# Patient Record
Sex: FEMALE | Race: BLACK OR AFRICAN AMERICAN | NOT HISPANIC OR LATINO | Employment: FULL TIME | URBAN - METROPOLITAN AREA
[De-identification: names, ages, dates, MRNs, and addresses within clinical notes are randomized per-mention and may not be internally consistent; named-entity substitution may affect disease eponyms.]

---

## 2018-05-31 ENCOUNTER — HOSPITAL ENCOUNTER (OUTPATIENT)
Dept: RADIOLOGY | Facility: HOSPITAL | Age: 53
Discharge: HOME | End: 2018-05-31
Attending: INTERNAL MEDICINE
Payer: COMMERCIAL

## 2018-05-31 ENCOUNTER — OFFICE VISIT (OUTPATIENT)
Dept: INTERNAL MEDICINE | Facility: CLINIC | Age: 53
End: 2018-05-31
Payer: COMMERCIAL

## 2018-05-31 VITALS
DIASTOLIC BLOOD PRESSURE: 90 MMHG | TEMPERATURE: 98 F | HEIGHT: 63 IN | WEIGHT: 185.44 LBS | HEART RATE: 84 BPM | RESPIRATION RATE: 16 BRPM | BODY MASS INDEX: 32.86 KG/M2 | SYSTOLIC BLOOD PRESSURE: 136 MMHG

## 2018-05-31 DIAGNOSIS — Z12.31 ENCOUNTER FOR SCREENING MAMMOGRAM FOR HIGH-RISK PATIENT: ICD-10-CM

## 2018-05-31 DIAGNOSIS — Z00.00 ANNUAL PHYSICAL EXAM: Primary | ICD-10-CM

## 2018-05-31 DIAGNOSIS — Z12.11 SCREEN FOR COLON CANCER: ICD-10-CM

## 2018-05-31 DIAGNOSIS — Z11.59 NEED FOR HEPATITIS C SCREENING TEST: ICD-10-CM

## 2018-05-31 DIAGNOSIS — R03.0 ELEVATED BLOOD PRESSURE READING: ICD-10-CM

## 2018-05-31 DIAGNOSIS — E66.9 OBESITY (BMI 30.0-34.9): ICD-10-CM

## 2018-05-31 PROBLEM — R81 GLUCOSURIA: Status: ACTIVE | Noted: 2018-05-31

## 2018-05-31 PROCEDURE — 77067 SCR MAMMO BI INCL CAD: CPT | Mod: TC,PO

## 2018-05-31 PROCEDURE — 99386 PREV VISIT NEW AGE 40-64: CPT | Mod: S$GLB,,, | Performed by: INTERNAL MEDICINE

## 2018-05-31 PROCEDURE — 77067 SCR MAMMO BI INCL CAD: CPT | Mod: 26,,, | Performed by: RADIOLOGY

## 2018-05-31 PROCEDURE — 77063 BREAST TOMOSYNTHESIS BI: CPT | Mod: 26,,, | Performed by: RADIOLOGY

## 2018-05-31 PROCEDURE — 99999 PR PBB SHADOW E&M-NEW PATIENT-LVL IV: CPT | Mod: PBBFAC,,, | Performed by: INTERNAL MEDICINE

## 2018-05-31 NOTE — PROGRESS NOTES
Subjective:      Elva Rob is a 52 y.o. female who presents for annual exam.    Family History:  family history includes Breast cancer (age of onset: 55) in her mother; Diabetes in her mother; Gestational diabetes in her sister; Hypertension in her father; Prostate cancer in her paternal uncle.    Health Maintenance:  Health Maintenance    Patient has no pending health maintenance at this time       Eye exam: wears reading glasses, last appt 1 year  Dental Exam: 4 yrs ago, no issues  OB/GYN: Dr. Miner,@ [facility], she plans to make appt    MMG: due now  Last Pap: not in last 3 years  Tetanus: reports UTD    Exercise: reports that she is physically active  Diet: fairly healthy  Body mass index is 32.84 kg/m².    Meds: No current outpatient prescriptions on file.    PMHx:   Past Medical History:   Diagnosis Date    Nephrolithiasis        PSHx:   History reviewed. No pertinent surgical history.    SocHx:   Social History     Social History    Marital status: Single     Spouse name: N/A    Number of children: N/A    Years of education: N/A     Social History Main Topics    Smoking status: Never Smoker    Smokeless tobacco: Never Used    Alcohol use Yes      Comment: occasionaltl    Drug use: Unknown    Sexual activity: Not Asked     Other Topics Concern    None     Social History Narrative    None       Review of Systems   Constitutional: Negative for chills, fatigue and fever.   HENT: Negative for congestion, dental problem, ear discharge, ear pain, mouth sores, postnasal drip, rhinorrhea, sinus pressure and sore throat.    Eyes: Negative for redness and visual disturbance.   Respiratory: Negative for cough, chest tightness and shortness of breath.    Cardiovascular: Negative for chest pain, palpitations and leg swelling.   Gastrointestinal: Positive for diarrhea (and gas). Negative for abdominal pain, blood in stool, constipation, nausea and vomiting.   Endocrine: Positive for polydipsia. Negative  for cold intolerance, heat intolerance, polyphagia and polyuria.   Genitourinary: Negative for decreased urine volume, dysuria, flank pain and hematuria.   Musculoskeletal: Negative for arthralgias and myalgias.   Skin: Negative for rash.   Neurological: Negative for dizziness, weakness, light-headedness, numbness and headaches.   Hematological: Negative for adenopathy.   Psychiatric/Behavioral: Negative for dysphoric mood. The patient is not nervous/anxious.        Objective:      Physical Exam   Constitutional: She is oriented to person, place, and time. Vital signs are normal. She appears well-developed and well-nourished. No distress.   HENT:   Head: Normocephalic and atraumatic.   Right Ear: Hearing, tympanic membrane, external ear and ear canal normal. Tympanic membrane is not erythematous and not bulging.   Left Ear: Hearing, tympanic membrane, external ear and ear canal normal. Tympanic membrane is not erythematous and not bulging.   Nose: Nose normal.   Mouth/Throat: Uvula is midline, oropharynx is clear and moist and mucous membranes are normal. No oropharyngeal exudate or posterior oropharyngeal erythema.   Eyes: Conjunctivae, EOM and lids are normal. Pupils are equal, round, and reactive to light. No scleral icterus.   Neck: Normal range of motion. Neck supple. No thyroid mass and no thyromegaly present.   Cardiovascular: Normal rate, regular rhythm, normal heart sounds and intact distal pulses.    No murmur heard.  Pulmonary/Chest: Effort normal and breath sounds normal. She has no wheezes.   Abdominal: Soft. Bowel sounds are normal. She exhibits no distension. There is no hepatosplenomegaly. There is no tenderness. There is no rigidity, no rebound and no guarding.   Musculoskeletal: Normal range of motion. She exhibits no edema.   Lymphadenopathy:     She has no cervical adenopathy.        Right: No supraclavicular adenopathy present.        Left: No supraclavicular adenopathy present.   Neurological:  She is alert and oriented to person, place, and time. She has normal reflexes. Coordination and gait normal.   Skin: Skin is warm, dry and intact. No rash noted. She is not diaphoretic.   Psychiatric: She has a normal mood and affect.   Vitals reviewed.      Assessment:       1. Annual physical exam    2. Obesity (BMI 30.0-34.9)    3. Need for hepatitis C screening test    4. Elevated blood pressure reading    5. Encounter for screening mammogram for high-risk patient    6. Screen for colon cancer        Plan:       1. Annual physical exam  - CBC auto differential; Future  - Comprehensive metabolic panel; Future  - Hemoglobin A1c; Future  - Lipid panel; Future  - TSH; Future  - Urinalysis; Future    2. Elevated blood pressure reading  - repeat BP reading still elevated  - advised monitoring BP periodically and call if persistently >140/90's    3. Obesity (BMI 30.0-34.9)  - plans to increase physical activity    4. Need for hepatitis C screening test  - Hepatitis C antibody; Future    5. Encounter for screening mammogram for high-risk patient  - Mammo Digital Screening Bilat with CAD; Future    6. Screen for colon cancer  - Case request GI: COLONOSCOPY      RTC in 1 year or sooner if needed      Bebe Saldana MD

## 2018-06-11 ENCOUNTER — TELEPHONE (OUTPATIENT)
Dept: INTERNAL MEDICINE | Facility: CLINIC | Age: 53
End: 2018-06-11

## 2018-06-11 DIAGNOSIS — R81 GLUCOSURIA: ICD-10-CM

## 2018-06-11 DIAGNOSIS — Z00.00 ANNUAL PHYSICAL EXAM: Primary | ICD-10-CM

## 2018-12-15 ENCOUNTER — TELEPHONE (OUTPATIENT)
Dept: ENDOSCOPY | Facility: HOSPITAL | Age: 53
End: 2018-12-15

## 2020-10-05 ENCOUNTER — PATIENT MESSAGE (OUTPATIENT)
Dept: ADMINISTRATIVE | Facility: HOSPITAL | Age: 55
End: 2020-10-05

## 2021-01-04 ENCOUNTER — PATIENT MESSAGE (OUTPATIENT)
Dept: ADMINISTRATIVE | Facility: HOSPITAL | Age: 56
End: 2021-01-04

## 2021-04-05 ENCOUNTER — PATIENT MESSAGE (OUTPATIENT)
Dept: ADMINISTRATIVE | Facility: HOSPITAL | Age: 56
End: 2021-04-05

## 2022-01-01 ENCOUNTER — PATIENT MESSAGE (OUTPATIENT)
Dept: ADMINISTRATIVE | Facility: OTHER | Age: 57
End: 2022-01-01
Payer: COMMERCIAL

## 2022-01-01 ENCOUNTER — LAB VISIT (OUTPATIENT)
Dept: PRIMARY CARE CLINIC | Facility: OTHER | Age: 57
End: 2022-01-01
Attending: INTERNAL MEDICINE
Payer: COMMERCIAL

## 2022-01-01 DIAGNOSIS — R05.9 COUGH: ICD-10-CM

## 2022-01-01 PROCEDURE — U0003 INFECTIOUS AGENT DETECTION BY NUCLEIC ACID (DNA OR RNA); SEVERE ACUTE RESPIRATORY SYNDROME CORONAVIRUS 2 (SARS-COV-2) (CORONAVIRUS DISEASE [COVID-19]), AMPLIFIED PROBE TECHNIQUE, MAKING USE OF HIGH THROUGHPUT TECHNOLOGIES AS DESCRIBED BY CMS-2020-01-R: HCPCS | Performed by: INTERNAL MEDICINE

## 2022-01-05 LAB
SARS-COV-2 RNA RESP QL NAA+PROBE: NOT DETECTED
SARS-COV-2- CYCLE NUMBER: NORMAL

## 2022-02-08 ENCOUNTER — OFFICE VISIT (OUTPATIENT)
Dept: INTERNAL MEDICINE | Facility: CLINIC | Age: 57
End: 2022-02-08
Payer: COMMERCIAL

## 2022-02-08 ENCOUNTER — LAB VISIT (OUTPATIENT)
Dept: LAB | Facility: HOSPITAL | Age: 57
End: 2022-02-08
Attending: INTERNAL MEDICINE
Payer: COMMERCIAL

## 2022-02-08 ENCOUNTER — TELEPHONE (OUTPATIENT)
Dept: INTERNAL MEDICINE | Facility: CLINIC | Age: 57
End: 2022-02-08

## 2022-02-08 VITALS
HEIGHT: 63 IN | BODY MASS INDEX: 31.61 KG/M2 | WEIGHT: 178.38 LBS | HEART RATE: 92 BPM | TEMPERATURE: 97 F | RESPIRATION RATE: 18 BRPM | DIASTOLIC BLOOD PRESSURE: 86 MMHG | OXYGEN SATURATION: 98 % | SYSTOLIC BLOOD PRESSURE: 138 MMHG

## 2022-02-08 DIAGNOSIS — Z11.59 NEED FOR HEPATITIS C SCREENING TEST: ICD-10-CM

## 2022-02-08 DIAGNOSIS — E11.9 TYPE 2 DIABETES MELLITUS WITHOUT COMPLICATION, WITHOUT LONG-TERM CURRENT USE OF INSULIN: Primary | ICD-10-CM

## 2022-02-08 DIAGNOSIS — Z00.00 ANNUAL PHYSICAL EXAM: ICD-10-CM

## 2022-02-08 DIAGNOSIS — E66.9 OBESITY (BMI 30.0-34.9): ICD-10-CM

## 2022-02-08 DIAGNOSIS — Z12.11 SCREEN FOR COLON CANCER: ICD-10-CM

## 2022-02-08 DIAGNOSIS — R03.0 ELEVATED BLOOD PRESSURE READING IN OFFICE WITHOUT DIAGNOSIS OF HYPERTENSION: ICD-10-CM

## 2022-02-08 DIAGNOSIS — Z00.00 ANNUAL PHYSICAL EXAM: Primary | ICD-10-CM

## 2022-02-08 PROBLEM — E78.5 HYPERLIPIDEMIA ASSOCIATED WITH TYPE 2 DIABETES MELLITUS: Status: ACTIVE | Noted: 2022-02-08

## 2022-02-08 PROBLEM — E11.69 HYPERLIPIDEMIA ASSOCIATED WITH TYPE 2 DIABETES MELLITUS: Status: ACTIVE | Noted: 2022-02-08

## 2022-02-08 PROBLEM — R81 GLUCOSURIA: Status: RESOLVED | Noted: 2018-05-31 | Resolved: 2022-02-08

## 2022-02-08 LAB
ALBUMIN SERPL BCP-MCNC: 3.9 G/DL (ref 3.5–5.2)
ALP SERPL-CCNC: 99 U/L (ref 55–135)
ALT SERPL W/O P-5'-P-CCNC: 31 U/L (ref 10–44)
ANION GAP SERPL CALC-SCNC: 12 MMOL/L (ref 8–16)
AST SERPL-CCNC: 21 U/L (ref 10–40)
BASOPHILS # BLD AUTO: 0.03 K/UL (ref 0–0.2)
BASOPHILS NFR BLD: 0.6 % (ref 0–1.9)
BILIRUB SERPL-MCNC: 0.7 MG/DL (ref 0.1–1)
BUN SERPL-MCNC: 11 MG/DL (ref 6–20)
CALCIUM SERPL-MCNC: 9.8 MG/DL (ref 8.7–10.5)
CHLORIDE SERPL-SCNC: 99 MMOL/L (ref 95–110)
CHOLEST SERPL-MCNC: 225 MG/DL (ref 120–199)
CHOLEST/HDLC SERPL: 5 {RATIO} (ref 2–5)
CO2 SERPL-SCNC: 23 MMOL/L (ref 23–29)
CREAT SERPL-MCNC: 0.7 MG/DL (ref 0.5–1.4)
DIFFERENTIAL METHOD: ABNORMAL
EOSINOPHIL # BLD AUTO: 0.1 K/UL (ref 0–0.5)
EOSINOPHIL NFR BLD: 1.8 % (ref 0–8)
ERYTHROCYTE [DISTWIDTH] IN BLOOD BY AUTOMATED COUNT: 13.1 % (ref 11.5–14.5)
EST. GFR  (AFRICAN AMERICAN): >60 ML/MIN/1.73 M^2
EST. GFR  (NON AFRICAN AMERICAN): >60 ML/MIN/1.73 M^2
ESTIMATED AVG GLUCOSE: 303 MG/DL (ref 68–131)
GLUCOSE SERPL-MCNC: 306 MG/DL (ref 70–110)
HBA1C MFR BLD: 12.2 % (ref 4–5.6)
HCT VFR BLD AUTO: 43.3 % (ref 37–48.5)
HDLC SERPL-MCNC: 45 MG/DL (ref 40–75)
HDLC SERPL: 20 % (ref 20–50)
HGB BLD-MCNC: 13.9 G/DL (ref 12–16)
IMM GRANULOCYTES # BLD AUTO: 0.01 K/UL (ref 0–0.04)
IMM GRANULOCYTES NFR BLD AUTO: 0.2 % (ref 0–0.5)
LDLC SERPL CALC-MCNC: 153 MG/DL (ref 63–159)
LYMPHOCYTES # BLD AUTO: 2.5 K/UL (ref 1–4.8)
LYMPHOCYTES NFR BLD: 50.1 % (ref 18–48)
MCH RBC QN AUTO: 29 PG (ref 27–31)
MCHC RBC AUTO-ENTMCNC: 32.1 G/DL (ref 32–36)
MCV RBC AUTO: 90 FL (ref 82–98)
MONOCYTES # BLD AUTO: 0.4 K/UL (ref 0.3–1)
MONOCYTES NFR BLD: 7.6 % (ref 4–15)
NEUTROPHILS # BLD AUTO: 2 K/UL (ref 1.8–7.7)
NEUTROPHILS NFR BLD: 39.7 % (ref 38–73)
NONHDLC SERPL-MCNC: 180 MG/DL
NRBC BLD-RTO: 0 /100 WBC
PLATELET # BLD AUTO: 322 K/UL (ref 150–450)
PMV BLD AUTO: 10.3 FL (ref 9.2–12.9)
POTASSIUM SERPL-SCNC: 4 MMOL/L (ref 3.5–5.1)
PROT SERPL-MCNC: 7.9 G/DL (ref 6–8.4)
RBC # BLD AUTO: 4.79 M/UL (ref 4–5.4)
SODIUM SERPL-SCNC: 134 MMOL/L (ref 136–145)
TRIGL SERPL-MCNC: 135 MG/DL (ref 30–150)
TSH SERPL DL<=0.005 MIU/L-ACNC: 0.77 UIU/ML (ref 0.4–4)
WBC # BLD AUTO: 5.01 K/UL (ref 3.9–12.7)

## 2022-02-08 PROCEDURE — 3079F PR MOST RECENT DIASTOLIC BLOOD PRESSURE 80-89 MM HG: ICD-10-PCS | Mod: CPTII,S$GLB,, | Performed by: INTERNAL MEDICINE

## 2022-02-08 PROCEDURE — 3075F PR MOST RECENT SYSTOLIC BLOOD PRESS GE 130-139MM HG: ICD-10-PCS | Mod: CPTII,S$GLB,, | Performed by: INTERNAL MEDICINE

## 2022-02-08 PROCEDURE — 99386 PREV VISIT NEW AGE 40-64: CPT | Mod: S$GLB,,, | Performed by: INTERNAL MEDICINE

## 2022-02-08 PROCEDURE — 80061 LIPID PANEL: CPT | Performed by: INTERNAL MEDICINE

## 2022-02-08 PROCEDURE — 1160F PR REVIEW ALL MEDS BY PRESCRIBER/CLIN PHARMACIST DOCUMENTED: ICD-10-PCS | Mod: CPTII,S$GLB,, | Performed by: INTERNAL MEDICINE

## 2022-02-08 PROCEDURE — 80053 COMPREHEN METABOLIC PANEL: CPT | Performed by: INTERNAL MEDICINE

## 2022-02-08 PROCEDURE — 85025 COMPLETE CBC W/AUTO DIFF WBC: CPT | Performed by: INTERNAL MEDICINE

## 2022-02-08 PROCEDURE — 1159F MED LIST DOCD IN RCRD: CPT | Mod: CPTII,S$GLB,, | Performed by: INTERNAL MEDICINE

## 2022-02-08 PROCEDURE — 1159F PR MEDICATION LIST DOCUMENTED IN MEDICAL RECORD: ICD-10-PCS | Mod: CPTII,S$GLB,, | Performed by: INTERNAL MEDICINE

## 2022-02-08 PROCEDURE — 99999 PR PBB SHADOW E&M-EST. PATIENT-LVL IV: CPT | Mod: PBBFAC,,, | Performed by: INTERNAL MEDICINE

## 2022-02-08 PROCEDURE — 1160F RVW MEDS BY RX/DR IN RCRD: CPT | Mod: CPTII,S$GLB,, | Performed by: INTERNAL MEDICINE

## 2022-02-08 PROCEDURE — 84443 ASSAY THYROID STIM HORMONE: CPT | Performed by: INTERNAL MEDICINE

## 2022-02-08 PROCEDURE — 3008F BODY MASS INDEX DOCD: CPT | Mod: CPTII,S$GLB,, | Performed by: INTERNAL MEDICINE

## 2022-02-08 PROCEDURE — 86803 HEPATITIS C AB TEST: CPT | Performed by: INTERNAL MEDICINE

## 2022-02-08 PROCEDURE — 3079F DIAST BP 80-89 MM HG: CPT | Mod: CPTII,S$GLB,, | Performed by: INTERNAL MEDICINE

## 2022-02-08 PROCEDURE — 3075F SYST BP GE 130 - 139MM HG: CPT | Mod: CPTII,S$GLB,, | Performed by: INTERNAL MEDICINE

## 2022-02-08 PROCEDURE — 83036 HEMOGLOBIN GLYCOSYLATED A1C: CPT | Performed by: INTERNAL MEDICINE

## 2022-02-08 PROCEDURE — 36415 COLL VENOUS BLD VENIPUNCTURE: CPT | Mod: PO | Performed by: INTERNAL MEDICINE

## 2022-02-08 PROCEDURE — 99386 PR PREVENTIVE VISIT,NEW,40-64: ICD-10-PCS | Mod: S$GLB,,, | Performed by: INTERNAL MEDICINE

## 2022-02-08 PROCEDURE — 3008F PR BODY MASS INDEX (BMI) DOCUMENTED: ICD-10-PCS | Mod: CPTII,S$GLB,, | Performed by: INTERNAL MEDICINE

## 2022-02-08 PROCEDURE — 99999 PR PBB SHADOW E&M-EST. PATIENT-LVL IV: ICD-10-PCS | Mod: PBBFAC,,, | Performed by: INTERNAL MEDICINE

## 2022-02-08 RX ORDER — INSULIN PUMP SYRINGE, 3 ML
1 EACH MISCELLANEOUS ONCE
Qty: 1 EACH | Refills: 0
Start: 2022-02-08 | End: 2022-02-08

## 2022-02-08 RX ORDER — METFORMIN HYDROCHLORIDE 500 MG/1
500 TABLET ORAL 2 TIMES DAILY WITH MEALS
Qty: 180 TABLET | Refills: 3
Start: 2022-02-08

## 2022-02-08 RX ORDER — LANCETS
1 EACH MISCELLANEOUS 2 TIMES DAILY
Qty: 100 EACH | Refills: 11
Start: 2022-02-08

## 2022-02-08 NOTE — PATIENT INSTRUCTIONS
"  Patient information: Low-sodium diet (The Basics)       What is sodium? -- Sodium is the main ingredient in table salt. It is also found in lots of foods, and even in water. The body needs a very small amount of sodium to work normally, but most people eat much more sodium than their body needs.       Who should cut down on sodium? -- Nearly everyone eats too much sodium. The average American takes in 3,400 milligrams of sodium each day. Experts say that many people should have no more than 2,300 milligrams a day, and many people should have even less.       Why should I cut down on sodium? -- Reducing the amount of sodium you eat can have lots of health benefits:     ?   It can lower your blood pressure, which means it can help reduce your risk of stroke, heart attack, kidney damage, and lots of other health problems.  ?   It can reduce the amount of fluid in your body, which means that your heart doesn't have to work as hard to push a lot of fluid around.  ?   It can keep the kidneys from having to work too hard. This is especially important in people who have kidney disease.  ?   It can reduce swelling in the ankles and belly, which can be uncomfortable and make it hard to move.  ?   It can reduce the chances of forming kidney stones.  ?   It can help keep your bones strong.      Which foods have the most sodium? -- Processed foods have the most sodium. These foods usually come in cans, boxes, jars, and bags. They tend to have a lot of sodium even if they don't taste salty. In fact, many sweet foods have a lot of sodium in them. The only way to know for sure how much sodium you are getting is to check the label.    Here are some examples of foods that often have too much sodium:     ?   Canned soups  ?   Rice and noodle mixes   ?   Sauces, dressings, and condiments (such as ketchup and mustard)  ?   Pre-made frozen meals (also called "TV dinners")  ?   Deli meats, hot dogs, and cheeses   ?   Smoked, cured, or " "pickled foods  ?   Restaurant meals     What should I do to reduce the amount of sodium in my diet? -- Many people think that avoiding the salt shaker and not adding salt to their food means that they are eating a low-sodium diet. This is not true. Not adding salt at the table or when cooking will help a little. But almost all of the sodium you eat is already in the food you buy at the grocery store or at restaurants.    The most important thing you can do to cut down on sodium is to eat less processed food. That means that you should avoid most foods that are sold in cans, boxes, jars and bags. You should also eat in restaurants less often.     Instead of buying pre-made, processed foods, buy fresh or fresh-frozen fruits and vegetables. (Fresh-frozen foods are foods that are frozen without anything added to them.) Buy meats, fish, chicken, and turkey that are fresh instead of canned or sold at the deli counter. (Meats sold at the deli counter are high in sodium). Then try making meals from scratch at home using these low-sodium ingredients.    If you must buy canned or packaged foods, choose ones that are labeled "sodium free" or "very low sodium". Or choose foods that have less than 400 milligrams of sodium in each serving. The amount of sodium in each serving appears on the nutrition label that is printed on canned or packaged foods.    Also, whatever changes you make, make them slowly. Choose one thing to do differently, and do that for a while. If that change sticks, add another change. For instance, if you usually eat green beans from a can, try buying fresh or fresh-frozen green beans and cooking them at home without adding salt. If that works for you, keep doing it. Then choose another thing to change. If it doesn't work, don't give up. See if you can cut down on sodium another way. The important thing is to take small steps and to stick with the changes that work for you.       What if I really like to eat " "out? -- You can still eat in restaurants once in a while. But choose places that offer healthier choices. Fast-food places are almost always a bad idea. As an example, a typical meal of a hamburger and french fries from a popular fast-food chain has about 1,600 milligrams of sodium. That's more sodium than many people should eat in a day!     When choosing what to order:    ?   Ask your  if your meal can be made without salt  ?   Avoid foods that come with sauces or dips  ?   Choose plain grilled meats or fish and steamed vegetables  ?   Ask for oil and vinegar for your salad, rather than dressing      What if food just does not taste as good without sodium? -- First of all, give it time. Your taste buds can get used to having less sodium, but you have to give them a chance to adjust. Also try other flavorings, such as herbs and spices, lemon juice, and vinegar.      What about salt substitutes? -- Do not use salt substitutes unless your doctor or nurse approves. Some salt substitutes can be dangerous to your health, especially if you take certain medicines.      Do medicines have sodium? -- Yes, some medicines have sodium. If you are buying medicines you can get without a prescription, look to see how much sodium they have. Avoid products that have "sodium carbonate" or  'sodium bicarbonate" unless your doctor prescribes them. (Sodium bicarbonate is baking soda.)      This topic retrieved from [employer] on: Feb 17, 2016      "

## 2022-02-08 NOTE — PROGRESS NOTES
Subjective:     PCP: Bebe Saldana MD    Elva Rob is a 56 y.o. female who presents for an annual exam. She has not been seen in clinic since 2018 and will consider her a new patient.     Medical History:   Past Medical History:   Diagnosis Date    Nephrolithiasis        Family History: family history includes Breast cancer (age of onset: 55) in her mother; Diabetes in her mother; Gestational diabetes in her sister; Hypertension in her father; Prostate cancer in her paternal uncle; Thyroid disease in her maternal aunt.    Surgical History: History reviewed. No pertinent surgical history.     Social History:  reports that she has never smoked. She has never used smokeless tobacco. She reports current alcohol use.     Allergies: Review of patient's allergies indicates:  No Known Allergies    Medications:   No current outpatient medications on file.     No current facility-administered medications for this visit.       Health Maintenance:    Eye Exam:   due   Dental Exam: due   OB/GYN: has appointment   Pap smear: due, has appointment   Mammogram: due     Colonoscopy:   due   Hepatitic C  Ab: ordered    Vaccinations:  Immunization History   Administered Date(s) Administered    COVID-19, MRNA, LN-S, PF (Pfizer) (Purple Cap) 03/05/2021, 03/26/2021, 10/05/2021      Influenza: declines   Tetanus: declines   Shingrix: due   Covid vaccine: done    Body mass index is 31.59 kg/m².  Wt Readings from Last 3 Encounters:   02/08/22 80.9 kg (178 lb 5.6 oz)   05/31/18 84.1 kg (185 lb 6.5 oz)       Review of Systems   Constitutional: Negative for chills, diaphoresis, fatigue and fever.   HENT: Negative for congestion, dental problem, ear discharge, ear pain, postnasal drip, rhinorrhea, sinus pressure, sore throat and trouble swallowing.    Eyes: Negative for redness and visual disturbance.   Respiratory: Negative for cough, chest tightness and shortness of breath.    Cardiovascular: Negative for chest pain,  palpitations and leg swelling.   Gastrointestinal: Negative for abdominal pain, blood in stool, constipation, diarrhea, nausea and vomiting.        She noticed that she produces only a small amount of stool with each BM. Denies hard stool.   Endocrine: Negative for cold intolerance, polydipsia and polyuria.   Genitourinary: Negative for decreased urine volume, dysuria, frequency and hematuria.   Musculoskeletal: Negative for arthralgias, back pain and myalgias.   Skin: Negative for rash and wound.   Neurological: Negative for dizziness, weakness, numbness and headaches.   Hematological: Negative for adenopathy.   Psychiatric/Behavioral: Negative for dysphoric mood and sleep disturbance. The patient is not nervous/anxious.           Objective:     Physical Exam  Vitals reviewed.   Constitutional:       General: She is awake. She is not in acute distress.     Appearance: Normal appearance. She is well-developed and well-groomed. She is not diaphoretic.   HENT:      Head: Normocephalic and atraumatic.      Right Ear: Hearing, tympanic membrane, ear canal and external ear normal. Tympanic membrane is not erythematous or bulging.      Left Ear: Hearing, tympanic membrane, ear canal and external ear normal. Tympanic membrane is not erythematous or bulging.      Nose: Nose normal. No congestion.      Mouth/Throat:      Mouth: Mucous membranes are moist.      Tongue: No lesions.      Pharynx: Oropharynx is clear. Uvula midline. No oropharyngeal exudate or posterior oropharyngeal erythema.   Eyes:      General: Lids are normal. Vision grossly intact. Gaze aligned appropriately. No scleral icterus.     Conjunctiva/sclera:      Right eye: Right conjunctiva is not injected.      Left eye: Left conjunctiva is not injected.      Pupils: Pupils are equal, round, and reactive to light.   Neck:      Thyroid: No thyroid mass. Thyromegaly: right lobe is palpable    Cardiovascular:      Rate and Rhythm: Normal rate and regular rhythm.       Pulses: Normal pulses.      Heart sounds: Normal heart sounds. No murmur heard.      Pulmonary:      Effort: Pulmonary effort is normal. No respiratory distress.      Breath sounds: Normal breath sounds. No decreased breath sounds or wheezing.   Chest:   Breasts:      Right: No supraclavicular adenopathy.      Left: No supraclavicular adenopathy.       Abdominal:      General: Bowel sounds are normal. There is no distension.      Palpations: Abdomen is soft. Abdomen is not rigid.      Tenderness: There is no abdominal tenderness. There is no guarding or rebound.   Musculoskeletal:         General: Normal range of motion.      Cervical back: Normal range of motion and neck supple.      Right lower leg: No edema.      Left lower leg: No edema.   Lymphadenopathy:      Cervical: No cervical adenopathy.      Upper Body:      Right upper body: No supraclavicular adenopathy.      Left upper body: No supraclavicular adenopathy.   Skin:     General: Skin is warm and dry.      Coloration: Skin is not cyanotic.      Findings: No lesion or rash.      Nails: There is no clubbing.   Neurological:      General: No focal deficit present.      Mental Status: She is alert and oriented to person, place, and time.      Sensory: Sensation is intact.      Coordination: Coordination is intact.      Gait: Gait is intact.      Deep Tendon Reflexes: Reflexes are normal and symmetric.   Psychiatric:         Attention and Perception: Attention normal.         Mood and Affect: Mood normal.         Behavior: Behavior is cooperative.          Assessment:        1. Annual physical exam    2. Elevated blood pressure reading in office without diagnosis of hypertension    3. Obesity (BMI 30.0-34.9)    4. Screen for colon cancer    5. Need for hepatitis C screening test           Plan:     1. Annual physical exam  - Hemoglobin A1C; Future  - CBC Auto Differential; Future  - Comprehensive Metabolic Panel; Future  - Lipid Panel; Future  - TSH;  Future  - Urinalysis; Future    2. Elevated blood pressure reading in office without diagnosis of hypertension  - avoid all decongestants, decrease salt intake, use salt-free seasoning blends  - advised patient to check BP at work a few times each week and report readings if close to or greater than 140/90  - will consider starting HCTZ if readings remain elevated    3. Obesity (BMI 30.0-34.9)  - has a treadmill and she wants to use it regularly    4. Screen for colon cancer  - Case Request Endoscopy: COLONOSCOPY    5. Need for hepatitis C screening test  - Hepatitis C Antibody; Future      RTC in 3 months for elevated BP or sooner if needed    __________________________    Bebe Saldana MD, PharmD  [facility]  American Board of Obesity Medicine diplomate  Office [phone]

## 2022-02-08 NOTE — TELEPHONE ENCOUNTER
----- Message from Bebe Saldana MD sent at 2/8/2022  1:01 PM CST -----  >>>>> 3 month follow-up for elevated BP

## 2022-02-09 ENCOUNTER — TELEPHONE (OUTPATIENT)
Dept: DIABETES | Facility: CLINIC | Age: 57
End: 2022-02-09
Payer: COMMERCIAL

## 2022-02-09 ENCOUNTER — TELEPHONE (OUTPATIENT)
Dept: INTERNAL MEDICINE | Facility: CLINIC | Age: 57
End: 2022-02-09
Payer: COMMERCIAL

## 2022-02-09 ENCOUNTER — TELEPHONE (OUTPATIENT)
Dept: INTERNAL MEDICINE | Facility: CLINIC | Age: 57
End: 2022-02-09

## 2022-02-09 LAB — HCV AB SERPL QL IA: NEGATIVE

## 2022-02-09 NOTE — TELEPHONE ENCOUNTER
----- Message from Bebe Saldana MD sent at 2/8/2022  9:26 PM CST -----  >>> newly-diagnosed diabetic, please find out which pharmacy she chooses and will send meds  >>>>> also make a f/u appointment within the next 2-3 weeks

## 2022-02-09 NOTE — TELEPHONE ENCOUNTER
----- Message from Bebe Saldana MD sent at 2/8/2022  9:30 PM CST -----  The urinalysis is abnormal. There is a lot of sugar in the urine because of the diabetes and high blood sugar level. The urine is also very concentrated so please increase your water intake.     Have you noticed any symptoms of burning with urination or vaginal discharge?

## 2022-02-09 NOTE — TELEPHONE ENCOUNTER
----- Message from Bebe Saldana MD sent at 2/8/2022  9:21 PM CST -----  Your hemoglobin A1c is very high and you have poorly controlled diabetes.     I will refer you to the Diabetes Educators to discuss management with you. I will order a blood glucometer so you can start monitoring the blood sugar levels. I will order metformin 500mg twice daily. Please start by taking 500mg once every day for the first 2-4 days and then increase to twice daily.    The total cholesterol and LDL cholesterol levels are borderline-high. But they are too high and we will need to discuss treatment.    Thyroid function, kidney function, liver function tests and blood counts are normal.

## 2022-02-18 ENCOUNTER — PATIENT OUTREACH (OUTPATIENT)
Dept: ADMINISTRATIVE | Facility: HOSPITAL | Age: 57
End: 2022-02-18
Payer: COMMERCIAL

## 2022-02-18 DIAGNOSIS — Z12.31 OTHER SCREENING MAMMOGRAM: ICD-10-CM

## 2022-02-18 NOTE — LETTER
AUTHORIZATION FOR RELEASE OF   CONFIDENTIAL INFORMATION        We are seeing Elva Rob, date of birth 1965, in the clinic at [facility]. Bebe Saldana MD is the patient's PCP. Elva Rob has an outstanding lab/procedure at the time we reviewed her chart. In order to help keep her health information updated, she has authorized us to request the following medical record(s):        (  )  MAMMOGRAM                                      (  )  COLONOSCOPY      ( x )  PAP SMEAR                                          (  )  OUTSIDE LAB RESULTS     (  )  DEXA SCAN                                          (  )  EYE EXAM            (  )  FOOT EXAM                                          (  )  ENTIRE RECORD     (  )  OUTSIDE IMMUNIZATIONS                 (  )  _______________         Please fax records to [facility], Linnea T Perkins, MD, [phone]     If you have any questions, please contact Kareen at [phone]          Patient Name: Elva Rob  : 1965  Patient Phone #: [phone]

## 2022-02-24 NOTE — TELEPHONE ENCOUNTER
Pt has been called multiple times and message left.    No response    Would you like me to call her sister?

## 2022-02-25 NOTE — TELEPHONE ENCOUNTER
She has seen the results and comments. She made a f/u appointment online for OB/GYN.     Please send her a message and if she does not answer, make an appointment on the same day as GYN appointment, if schedule allows.

## 2022-03-08 NOTE — TELEPHONE ENCOUNTER
Portal message came back unread.    Appt with OB/GYN is tomorrow, we don't have anything available.    Left another detailed message on voicemail for her to call me back with name of pharmacy and schedule a f/u

## 2022-03-09 ENCOUNTER — OFFICE VISIT (OUTPATIENT)
Dept: OBSTETRICS AND GYNECOLOGY | Facility: CLINIC | Age: 57
End: 2022-03-09
Payer: COMMERCIAL

## 2022-03-09 VITALS
DIASTOLIC BLOOD PRESSURE: 86 MMHG | HEIGHT: 63 IN | SYSTOLIC BLOOD PRESSURE: 128 MMHG | BODY MASS INDEX: 31.36 KG/M2 | WEIGHT: 177 LBS

## 2022-03-09 DIAGNOSIS — E11.69 TYPE 2 DIABETES MELLITUS WITH OTHER SPECIFIED COMPLICATION, WITHOUT LONG-TERM CURRENT USE OF INSULIN: ICD-10-CM

## 2022-03-09 DIAGNOSIS — Z12.11 ENCOUNTER FOR SCREENING COLONOSCOPY: ICD-10-CM

## 2022-03-09 DIAGNOSIS — Z01.419 ENCOUNTER FOR WELL WOMAN EXAM WITH ROUTINE GYNECOLOGICAL EXAM: Primary | ICD-10-CM

## 2022-03-09 PROCEDURE — 3008F PR BODY MASS INDEX (BMI) DOCUMENTED: ICD-10-PCS | Mod: CPTII,S$GLB,, | Performed by: OBSTETRICS & GYNECOLOGY

## 2022-03-09 PROCEDURE — 3079F DIAST BP 80-89 MM HG: CPT | Mod: CPTII,S$GLB,, | Performed by: OBSTETRICS & GYNECOLOGY

## 2022-03-09 PROCEDURE — 87624 HPV HI-RISK TYP POOLED RSLT: CPT | Performed by: OBSTETRICS & GYNECOLOGY

## 2022-03-09 PROCEDURE — 87624 HPV HI-RISK TYP POOLED RSLT: CPT | Mod: 91 | Performed by: OBSTETRICS & GYNECOLOGY

## 2022-03-09 PROCEDURE — 3008F BODY MASS INDEX DOCD: CPT | Mod: CPTII,S$GLB,, | Performed by: OBSTETRICS & GYNECOLOGY

## 2022-03-09 PROCEDURE — 99999 PR PBB SHADOW E&M-EST. PATIENT-LVL III: ICD-10-PCS | Mod: PBBFAC,,, | Performed by: OBSTETRICS & GYNECOLOGY

## 2022-03-09 PROCEDURE — 3074F SYST BP LT 130 MM HG: CPT | Mod: CPTII,S$GLB,, | Performed by: OBSTETRICS & GYNECOLOGY

## 2022-03-09 PROCEDURE — 3074F PR MOST RECENT SYSTOLIC BLOOD PRESSURE < 130 MM HG: ICD-10-PCS | Mod: CPTII,S$GLB,, | Performed by: OBSTETRICS & GYNECOLOGY

## 2022-03-09 PROCEDURE — 88175 CYTOPATH C/V AUTO FLUID REDO: CPT | Performed by: OBSTETRICS & GYNECOLOGY

## 2022-03-09 PROCEDURE — 99999 PR PBB SHADOW E&M-EST. PATIENT-LVL III: CPT | Mod: PBBFAC,,, | Performed by: OBSTETRICS & GYNECOLOGY

## 2022-03-09 PROCEDURE — 3046F PR MOST RECENT HEMOGLOBIN A1C LEVEL > 9.0%: ICD-10-PCS | Mod: CPTII,S$GLB,, | Performed by: OBSTETRICS & GYNECOLOGY

## 2022-03-09 PROCEDURE — 99386 PREV VISIT NEW AGE 40-64: CPT | Mod: S$GLB,,, | Performed by: OBSTETRICS & GYNECOLOGY

## 2022-03-09 PROCEDURE — 99386 PR PREVENTIVE VISIT,NEW,40-64: ICD-10-PCS | Mod: S$GLB,,, | Performed by: OBSTETRICS & GYNECOLOGY

## 2022-03-09 PROCEDURE — 1159F PR MEDICATION LIST DOCUMENTED IN MEDICAL RECORD: ICD-10-PCS | Mod: CPTII,S$GLB,, | Performed by: OBSTETRICS & GYNECOLOGY

## 2022-03-09 PROCEDURE — 3046F HEMOGLOBIN A1C LEVEL >9.0%: CPT | Mod: CPTII,S$GLB,, | Performed by: OBSTETRICS & GYNECOLOGY

## 2022-03-09 PROCEDURE — 1159F MED LIST DOCD IN RCRD: CPT | Mod: CPTII,S$GLB,, | Performed by: OBSTETRICS & GYNECOLOGY

## 2022-03-09 PROCEDURE — 3079F PR MOST RECENT DIASTOLIC BLOOD PRESSURE 80-89 MM HG: ICD-10-PCS | Mod: CPTII,S$GLB,, | Performed by: OBSTETRICS & GYNECOLOGY

## 2022-03-09 NOTE — PROGRESS NOTES
History & Physical  Gynecology      SUBJECTIVE:     Chief Complaint: Annual Exam       History of Present Illness:    Elva Rob is a 56 y.o. female G0 here for annual routine Pap and checkup. No LMP recorded. Patient is postmenopausal..  She has no unusual complaints.  Pt was recently seen by PCP and was noted to have uncontrolled diabetes (Hgb A1C 12.2).  Was given metformin that patient hasn't started yet.  Also has not been checking BG levels.     Pt is not having any bleeding.  Pt is postmenopausal  denies break through bleeding.   denies vaginal itching or irritation.  denies vaginal discharge.    She is not sexually active at this time.    History of abnormal pap: No  Last Pap: none on file  Last MMG: Yes - 2018; order placed  Last Colonoscopy:  No      Review of patient's allergies indicates:  No Known Allergies    Past Medical History:   Diagnosis Date    Nephrolithiasis      History reviewed. No pertinent surgical history.  OB History             Para        Term   0            AB        Living           SAB        IAB        Ectopic        Multiple        Live Births                   Family History   Problem Relation Age of Onset    Breast cancer Mother 55    Diabetes Mother     Hypertension Father     Gestational diabetes Sister     Prostate cancer Paternal Uncle     Thyroid disease Maternal Aunt      Social History     Tobacco Use    Smoking status: Never Smoker    Smokeless tobacco: Never Used   Substance Use Topics    Alcohol use: Not Currently     Comment: occasionaltl    Drug use: Never       Current Outpatient Medications   Medication Sig    blood sugar diagnostic Strp 1 strip by Misc.(Non-Drug; Combo Route) route 2 (two) times daily. Please provide items covered by patient's insurance    lancets Misc 1 lancet by Misc.(Non-Drug; Combo Route) route 2 (two) times daily.    metFORMIN (GLUCOPHAGE) 500 MG tablet Take 1 tablet (500 mg total) by mouth 2 (two) times daily  with meals.    blood-glucose meter kit 1 each by Other route once. Use as instructed. Provide meter covered by patient's insurance for 1 dose     No current facility-administered medications for this visit.         Review of Systems:  Review of Systems   Constitutional: Negative for activity change, appetite change, fatigue, fever and unexpected weight change.   Respiratory: Negative for cough and shortness of breath.    Cardiovascular: Negative for chest pain and leg swelling.   Gastrointestinal: Negative for abdominal pain, blood in stool, constipation, diarrhea, nausea and vomiting.   Endocrine: Negative for diabetes, hair loss and hot flashes.   Genitourinary: Negative for pelvic pain, postcoital bleeding and postmenopausal bleeding.   Musculoskeletal: Negative for back pain.   Integumentary:  Negative for hair changes, breast mass, nipple discharge and breast skin changes.   Psychiatric/Behavioral: Negative for sleep disturbance. The patient is not nervous/anxious.    Breast: Negative for mass, mastodynia, nipple discharge and skin changes       OBJECTIVE:     Physical Exam:  Physical Exam  Constitutional:       Appearance: She is well-developed.   HENT:      Head: Normocephalic and atraumatic.   Eyes:      General: No scleral icterus.        Right eye: No discharge.         Left eye: No discharge.      Conjunctiva/sclera: Conjunctivae normal.   Pulmonary:      Effort: Pulmonary effort is normal.      Breath sounds: No stridor.   Chest:      Chest wall: No mass or tenderness.   Breasts: Breasts are symmetrical.      Right: No inverted nipple, mass, nipple discharge, skin change or tenderness.      Left: No inverted nipple, mass, nipple discharge, skin change or tenderness.       Abdominal:      General: There is no distension.      Palpations: Abdomen is soft.      Tenderness: There is no abdominal tenderness.   Genitourinary:     Labia:         Right: No rash, tenderness, lesion or injury.         Left: No  rash, tenderness, lesion or injury.       Vagina: Normal.      Cervix: No cervical motion tenderness, discharge or friability.      Adnexa:         Right: No mass, tenderness or fullness.          Left: No mass, tenderness or fullness.     Musculoskeletal:         General: Normal range of motion.   Skin:     General: Skin is warm and dry.   Neurological:      Mental Status: She is alert and oriented to person, place, and time.   Psychiatric:         Behavior: Behavior normal.         Thought Content: Thought content normal.         Judgment: Judgment normal.           ASSESSMENT:       ICD-10-CM ICD-9-CM    1. Encounter for well woman exam with routine gynecological exam  Z01.419 V72.31 Liquid-Based Pap Smear, Screening      HPV High Risk Genotypes, PCR   2. Encounter for screening colonoscopy  Z12.11 V76.51 Case Request Endoscopy: COLONOSCOPY   3. Type 2 diabetes mellitus with other specified complication, without long-term current use of insulin  E11.69 250.80 Ambulatory referral/consult to Diabetes Education          Plan:      Elva was seen today for annual exam.    Diagnoses and all orders for this visit:    Encounter for well woman exam with routine gynecological exam  -     Liquid-Based Pap Smear, Screening  -     HPV High Risk Genotypes, PCR  - Cotesting today  - MMG already ordered by PCP.  Will schedule  - Cscope ordered    Encounter for screening colonoscopy  -     Case Request Endoscopy: COLONOSCOPY    Type 2 diabetes mellitus with other specified complication, without long-term current use of insulin  - Discussed lifestyle changes and recommendation to check BG levels  - Pt does not feel comfortable with starting metformin.  Recommend that patient reach out to PCP to further discuss  - Will send for diabetic education.  -     Ambulatory referral/consult to Diabetes Education; Future        Orders Placed This Encounter   Procedures    HPV High Risk Genotypes, PCR    Ambulatory referral/consult to  Diabetes Education       Follow up in about 1 year (around 3/9/2023) for annual.     Face to Face time with patient: 25 min    30 minutes of total time spent on the encounter, which includes face to face time and non-face to face time preparing to see the patient (eg, review of tests), Obtaining and/or reviewing separately obtained history, Documenting clinical information in the electronic or other health record, Independently interpreting results (not separately reported) and communicating results to the patient/family/caregiver, or Care coordination (not separately reported).      Sofiya Henson

## 2022-03-15 LAB
CLINICAL INFO: NORMAL
CYTO CVX: NORMAL
CYTOLOGIST CVX/VAG CYTO: NORMAL
CYTOLOGIST CVX/VAG CYTO: NORMAL
CYTOLOGY CMNT CVX/VAG CYTO-IMP: NORMAL
CYTOLOGY PAP THIN PREP EXPLANATION: NORMAL
DATE OF PREVIOUS PAP: NORMAL
DATE PREVIOUS BX: NO
GEN CATEG CVX/VAG CYTO-IMP: NORMAL
HPV E6+E7 MRNA CVX QL NAA+PROBE: NOT DETECTED
HPV I/H RISK 4 DNA CVX QL NAA+PROBE: NORMAL
LMP START DATE: NORMAL
MICROORGANISM CVX/VAG CYTO: NORMAL
PATHOLOGIST CVX/VAG CYTO: NORMAL
SERVICE CMNT-IMP: NORMAL
SPECIMEN SOURCE CVX/VAG CYTO: NORMAL
STAT OF ADQ CVX/VAG CYTO-IMP: NORMAL

## 2022-03-22 ENCOUNTER — TELEPHONE (OUTPATIENT)
Dept: ADMINISTRATIVE | Facility: HOSPITAL | Age: 57
End: 2022-03-22
Payer: COMMERCIAL

## 2022-04-13 ENCOUNTER — TELEPHONE (OUTPATIENT)
Dept: ADMINISTRATIVE | Facility: HOSPITAL | Age: 57
End: 2022-04-13
Payer: COMMERCIAL

## 2022-06-01 ENCOUNTER — PATIENT MESSAGE (OUTPATIENT)
Dept: ADMINISTRATIVE | Facility: HOSPITAL | Age: 57
End: 2022-06-01
Payer: COMMERCIAL

## 2022-07-12 ENCOUNTER — PATIENT MESSAGE (OUTPATIENT)
Dept: ADMINISTRATIVE | Facility: HOSPITAL | Age: 57
End: 2022-07-12
Payer: COMMERCIAL

## 2022-08-24 ENCOUNTER — PATIENT MESSAGE (OUTPATIENT)
Dept: ADMINISTRATIVE | Facility: HOSPITAL | Age: 57
End: 2022-08-24
Payer: COMMERCIAL

## 2022-10-05 DIAGNOSIS — Z12.11 SCREENING FOR COLON CANCER: Primary | ICD-10-CM

## 2022-10-12 ENCOUNTER — PATIENT MESSAGE (OUTPATIENT)
Dept: ADMINISTRATIVE | Facility: HOSPITAL | Age: 57
End: 2022-10-12
Payer: COMMERCIAL

## 2023-03-02 ENCOUNTER — OFFICE VISIT (OUTPATIENT)
Dept: URGENT CARE | Facility: CLINIC | Age: 58
End: 2023-03-02
Payer: COMMERCIAL

## 2023-03-02 VITALS
RESPIRATION RATE: 16 BRPM | HEART RATE: 82 BPM | WEIGHT: 175 LBS | SYSTOLIC BLOOD PRESSURE: 142 MMHG | DIASTOLIC BLOOD PRESSURE: 87 MMHG | OXYGEN SATURATION: 98 % | BODY MASS INDEX: 31.01 KG/M2 | HEIGHT: 63 IN | TEMPERATURE: 98 F

## 2023-03-02 DIAGNOSIS — U07.1 COVID-19 VIRUS INFECTION: Primary | ICD-10-CM

## 2023-03-02 LAB
CTP QC/QA: YES
SARS-COV-2 AG RESP QL IA.RAPID: POSITIVE

## 2023-03-02 PROCEDURE — 1160F PR REVIEW ALL MEDS BY PRESCRIBER/CLIN PHARMACIST DOCUMENTED: ICD-10-PCS | Mod: CPTII,S$GLB,, | Performed by: NURSE PRACTITIONER

## 2023-03-02 PROCEDURE — 3079F PR MOST RECENT DIASTOLIC BLOOD PRESSURE 80-89 MM HG: ICD-10-PCS | Mod: CPTII,S$GLB,, | Performed by: NURSE PRACTITIONER

## 2023-03-02 PROCEDURE — 1159F MED LIST DOCD IN RCRD: CPT | Mod: CPTII,S$GLB,, | Performed by: NURSE PRACTITIONER

## 2023-03-02 PROCEDURE — 87811 SARS-COV-2 COVID19 W/OPTIC: CPT | Mod: QW,S$GLB,, | Performed by: NURSE PRACTITIONER

## 2023-03-02 PROCEDURE — 87811 SARS CORONAVIRUS 2 ANTIGEN POCT, MANUAL READ: ICD-10-PCS | Mod: QW,S$GLB,, | Performed by: NURSE PRACTITIONER

## 2023-03-02 PROCEDURE — 99203 OFFICE O/P NEW LOW 30 MIN: CPT | Mod: S$GLB,,, | Performed by: NURSE PRACTITIONER

## 2023-03-02 PROCEDURE — 3008F PR BODY MASS INDEX (BMI) DOCUMENTED: ICD-10-PCS | Mod: CPTII,S$GLB,, | Performed by: NURSE PRACTITIONER

## 2023-03-02 PROCEDURE — 3077F SYST BP >= 140 MM HG: CPT | Mod: CPTII,S$GLB,, | Performed by: NURSE PRACTITIONER

## 2023-03-02 PROCEDURE — 3077F PR MOST RECENT SYSTOLIC BLOOD PRESSURE >= 140 MM HG: ICD-10-PCS | Mod: CPTII,S$GLB,, | Performed by: NURSE PRACTITIONER

## 2023-03-02 PROCEDURE — 1159F PR MEDICATION LIST DOCUMENTED IN MEDICAL RECORD: ICD-10-PCS | Mod: CPTII,S$GLB,, | Performed by: NURSE PRACTITIONER

## 2023-03-02 PROCEDURE — 3008F BODY MASS INDEX DOCD: CPT | Mod: CPTII,S$GLB,, | Performed by: NURSE PRACTITIONER

## 2023-03-02 PROCEDURE — 99203 PR OFFICE/OUTPT VISIT, NEW, LEVL III, 30-44 MIN: ICD-10-PCS | Mod: S$GLB,,, | Performed by: NURSE PRACTITIONER

## 2023-03-02 PROCEDURE — 3079F DIAST BP 80-89 MM HG: CPT | Mod: CPTII,S$GLB,, | Performed by: NURSE PRACTITIONER

## 2023-03-02 PROCEDURE — 1160F RVW MEDS BY RX/DR IN RCRD: CPT | Mod: CPTII,S$GLB,, | Performed by: NURSE PRACTITIONER

## 2023-03-02 RX ORDER — FLUTICASONE PROPIONATE 50 MCG
1 SPRAY, SUSPENSION (ML) NASAL 2 TIMES DAILY
Qty: 9.9 ML | Refills: 0 | Status: SHIPPED | OUTPATIENT
Start: 2023-03-02

## 2023-03-02 RX ORDER — AZELASTINE 1 MG/ML
1 SPRAY, METERED NASAL 2 TIMES DAILY
Qty: 30 ML | Refills: 0 | Status: SHIPPED | OUTPATIENT
Start: 2023-03-02 | End: 2023-03-12

## 2023-03-02 RX ORDER — BENZONATATE 200 MG/1
200 CAPSULE ORAL 3 TIMES DAILY PRN
Qty: 30 CAPSULE | Refills: 0 | Status: SHIPPED | OUTPATIENT
Start: 2023-03-02

## 2023-03-02 RX ORDER — PROMETHAZINE HYDROCHLORIDE AND DEXTROMETHORPHAN HYDROBROMIDE 6.25; 15 MG/5ML; MG/5ML
5 SYRUP ORAL NIGHTLY PRN
Qty: 118 ML | Refills: 0 | Status: SHIPPED | OUTPATIENT
Start: 2023-03-02

## 2023-03-02 NOTE — PROGRESS NOTES
"Subjective:       Patient ID: Elva Rob is a 57 y.o. female.    Vitals:  height is 5' 3" (1.6 m) and weight is 79.4 kg (175 lb). Her oral temperature is 97.7 °F (36.5 °C). Her blood pressure is 142/87 (abnormal) and her pulse is 82. Her respiration is 16 and oxygen saturation is 98%.     Chief Complaint: Sinus Problem    57-year-old female presents to clinic for evaluation of runny nose, cough, congestion that initially started 3 weeks ago.  Patient does report a history of seasonal allergies that affects her sinuses.  She is vaccinated for COVID.  She has been taking over-the-counter medications with some relief.  She states that her symptoms have progressed over the last week with fatigue and fever.  She is awake alert, answers questions appropriately, no acute distress noted on today's visit.    Sinus Problem  This is a new problem. The current episode started 1 to 4 weeks ago. The problem is unchanged. There has been no fever. Her pain is at a severity of 0/10. She is experiencing no pain. Associated symptoms include congestion, coughing, sinus pressure and sneezing. Pertinent negatives include no chills, diaphoresis or shortness of breath. Past treatments include oral decongestants. The treatment provided no relief.     Constitution: Positive for fatigue. Negative for activity change, appetite change, chills and sweating.   HENT:  Positive for congestion and sinus pressure.    Cardiovascular:  Negative for chest pain.   Respiratory:  Positive for cough. Negative for shortness of breath.    Allergic/Immunologic: Positive for seasonal allergies and sneezing.     Objective:      Physical Exam   Constitutional: She is oriented to person, place, and time.  Non-toxic appearance. She does not appear ill. No distress.   HENT:   Head: Normocephalic and atraumatic.   Ears:   Right Ear: External ear normal.   Left Ear: External ear normal.   Nose: Congestion present.   Mouth/Throat: Mucous membranes are moist. " Posterior oropharyngeal erythema present. No oropharyngeal exudate. Oropharynx is clear.   Eyes: Conjunctivae are normal. Right eye exhibits no discharge. Left eye exhibits no discharge.   Cardiovascular: Normal rate.   Pulmonary/Chest: Effort normal and breath sounds normal. No respiratory distress. She has no wheezes.   Abdominal: Normal appearance.   Musculoskeletal: Normal range of motion.         General: Normal range of motion.   Neurological: She is alert and oriented to person, place, and time.   Skin: Skin is dry, not diaphoretic and not pale.   Psychiatric: Her behavior is normal. Mood, judgment and thought content normal.   Nursing note and vitals reviewed.      Results for orders placed or performed in visit on 03/02/23   SARS Coronavirus 2 Antigen, POCT Manual Read   Result Value Ref Range    SARS Coronavirus 2 Antigen Positive (A) Negative     Acceptable Yes        Assessment:       1. COVID-19 virus infection          Plan:         COVID-19 virus infection  -     SARS Coronavirus 2 Antigen, POCT Manual Read  -     COVID-19 Home Symptom Monitoring  - Duration (days): 14  -     fluticasone propionate (FLONASE) 50 mcg/actuation nasal spray; 1 spray (50 mcg total) by Each Nostril route 2 (two) times daily.  Dispense: 9.9 mL; Refill: 0  -     azelastine (ASTELIN) 137 mcg (0.1 %) nasal spray; 1 spray (137 mcg total) by Nasal route 2 (two) times daily. for 10 days  Dispense: 30 mL; Refill: 0  -     promethazine-dextromethorphan (PROMETHAZINE-DM) 6.25-15 mg/5 mL Syrp; Take 5 mLs by mouth nightly as needed (cough).  Dispense: 118 mL; Refill: 0  -     benzonatate (TESSALON) 200 MG capsule; Take 1 capsule (200 mg total) by mouth 3 (three) times daily as needed for Cough.  Dispense: 30 capsule; Refill: 0    - given on clarity of when symptoms started, discussed holding on antivirals.  Discussed symptomatic care.  Follow-up with PCP.  Patient verbalized understanding and is in agreement with  plan.    Patient Instructions   - Follow up with your PCP or specialty clinic as directed in the next 1-2 weeks if not improved or as needed.  You can call [phone] to schedule an appointment with the appropriate provider.    - Go to the ER or seek medical attention immediately if you develop new or worsening symptoms.    - You must understand that you have received an Urgent Care treatment only and that you may be released before all of your medical problems are known or treated.   - You, the patient, will arrange for follow up care as instructed.   - If your condition worsens or fails to improve we recommend that you receive another evaluation at the ER immediately or contact your PCP to discuss your concerns or return here.     Recommend daily antihistamine (Claritin, Zyrtec, or Allegra), decongestant (Mucinex, or Coricidin if hx of HTN), cough suppressant, Nasal spray (Flonase), Tylenol (if not contraindicated) for pain or fever, along with plenty of fluids and rest. Discussed POSITIVE Covid result w/ patient. Discussed quarantine instructions. Patient verbally understood and agreed with treatment plan. ER for worsening symptoms.     You have tested POSITIVE for COVID-19 today.           ISOLATION    If you tested positive and do not have symptoms, you must isolate for 5 days starting on the day of the positive test. I       If you tested positive and have symptoms, you must isolate for 5 days starting on the day of the first symptoms,  not the day of the positive test.       This is the most important part, both the CDC and the [facility] emphasize that you do not test out of isolation.       After 5 days, if your symptoms have improved and you have not had fever on day 5, you can return to the community on day 6- NO TESTING REQUIRED!        In fact, we do not retest if you were positive in the last 90 days.       After your 5 days of isolation are completed, the CDC recommends strict mask use for the first 5  days that you come out of isolation.     CDC Testing and Quarantine Guidelines for patients with exposure to a known-positive COVID-19 person:    ·     A 'close exposure' is defined as anyone who has had an exposure (masked or unmasked) to a known COVID -19 positive person            within 6 feet of someone            for a cumulative total of 15 minutes or more over a 24-hour period.    ·     vaccinated Have been boosted or completed the primary series of Pfizer or Moderna vaccine within the last 6 months or completed the primary series of J&J vaccine within the last 2 months and/or had a positive test within 90 days            do NOT need to quarantine after contact with someone who had COVID-19 unless they have symptoms.            fully vaccinated people who have not had a positive test within 90 days, should get tested 3-5 days after their exposure, even if they don't have symptoms and wear a mask indoors in public for 10 days following exposure or until their test result is negative on day 5.            If you develop symptoms test and quarantine.         ·     Unvaccinated, or are more than six months out from their second mRNA dose (or more than 2 months after the J&J vaccine) and not yet boosted,  and/or NOT had a positive test within 90 days and meet 'close exposure'    you are required by CDC guidelines to quarantine for at least 5 days from time of exposure followed by 5 days of strict masking. It is recommended, but not required to test after 5 days, unless you develop symptoms, in which case you should test at that time.    If you do decide to test at 5 days and are asymptomatic, the risk is that if you test without symptoms on Day 5 for example) and you are positive, your 5 day isolation begins on that day, and you turned your 5 day quarantine into 10 days.            If your exposure does not meet the above definition, you can return to your normal daily activities to include social distancing,  wearing a mask and frequent handwashing.    Alternatively, if a 5-day quarantine is not feasible, it is imperative that an exposed person wear a well-fitting mask at all times when around others for 10 days after exposure.

## 2023-03-02 NOTE — PATIENT INSTRUCTIONS
- Follow up with your PCP or specialty clinic as directed in the next 1-2 weeks if not improved or as needed.  You can call [phone] to schedule an appointment with the appropriate provider.    - Go to the ER or seek medical attention immediately if you develop new or worsening symptoms.    - You must understand that you have received an Urgent Care treatment only and that you may be released before all of your medical problems are known or treated.   - You, the patient, will arrange for follow up care as instructed.   - If your condition worsens or fails to improve we recommend that you receive another evaluation at the ER immediately or contact your PCP to discuss your concerns or return here.     Recommend daily antihistamine (Claritin, Zyrtec, or Allegra), decongestant (Mucinex, or Coricidin if hx of HTN), cough suppressant, Nasal spray (Flonase), Tylenol (if not contraindicated) for pain or fever, along with plenty of fluids and rest. Discussed POSITIVE Covid result w/ patient. Discussed quarantine instructions. Patient verbally understood and agreed with treatment plan. ER for worsening symptoms.     You have tested POSITIVE for COVID-19 today.           ISOLATION    If you tested positive and do not have symptoms, you must isolate for 5 days starting on the day of the positive test. I       If you tested positive and have symptoms, you must isolate for 5 days starting on the day of the first symptoms,  not the day of the positive test.       This is the most important part, both the CDC and the [facility] emphasize that you do not test out of isolation.       After 5 days, if your symptoms have improved and you have not had fever on day 5, you can return to the community on day 6- NO TESTING REQUIRED!        In fact, we do not retest if you were positive in the last 90 days.       After your 5 days of isolation are completed, the CDC recommends strict mask use for the first 5 days that you come out of  isolation.     CDC Testing and Quarantine Guidelines for patients with exposure to a known-positive COVID-19 person:    ·     A 'close exposure' is defined as anyone who has had an exposure (masked or unmasked) to a known COVID -19 positive person            within 6 feet of someone            for a cumulative total of 15 minutes or more over a 24-hour period.    ·     vaccinated Have been boosted or completed the primary series of Pfizer or Moderna vaccine within the last 6 months or completed the primary series of J&J vaccine within the last 2 months and/or had a positive test within 90 days            do NOT need to quarantine after contact with someone who had COVID-19 unless they have symptoms.            fully vaccinated people who have not had a positive test within 90 days, should get tested 3-5 days after their exposure, even if they don't have symptoms and wear a mask indoors in public for 10 days following exposure or until their test result is negative on day 5.            If you develop symptoms test and quarantine.         ·     Unvaccinated, or are more than six months out from their second mRNA dose (or more than 2 months after the J&J vaccine) and not yet boosted,  and/or NOT had a positive test within 90 days and meet 'close exposure'    you are required by CDC guidelines to quarantine for at least 5 days from time of exposure followed by 5 days of strict masking. It is recommended, but not required to test after 5 days, unless you develop symptoms, in which case you should test at that time.    If you do decide to test at 5 days and are asymptomatic, the risk is that if you test without symptoms on Day 5 for example) and you are positive, your 5 day isolation begins on that day, and you turned your 5 day quarantine into 10 days.            If your exposure does not meet the above definition, you can return to your normal daily activities to include social distancing, wearing a mask and frequent  handwashing.    Alternatively, if a 5-day quarantine is not feasible, it is imperative that an exposed person wear a well-fitting mask at all times when around others for 10 days after exposure.

## 2023-04-14 ENCOUNTER — PATIENT MESSAGE (OUTPATIENT)
Dept: RESEARCH | Facility: HOSPITAL | Age: 58
End: 2023-04-14
Payer: COMMERCIAL

## 2023-05-09 ENCOUNTER — PATIENT MESSAGE (OUTPATIENT)
Dept: RESEARCH | Facility: HOSPITAL | Age: 58
End: 2023-05-09
Payer: COMMERCIAL

## 2024-01-24 ENCOUNTER — PATIENT OUTREACH (OUTPATIENT)
Dept: ADMINISTRATIVE | Facility: HOSPITAL | Age: 59
End: 2024-01-24
Payer: COMMERCIAL

## 2024-01-24 ENCOUNTER — PATIENT MESSAGE (OUTPATIENT)
Dept: ADMINISTRATIVE | Facility: HOSPITAL | Age: 59
End: 2024-01-24
Payer: COMMERCIAL

## 2024-01-24 NOTE — PROGRESS NOTES

## 2024-02-01 ENCOUNTER — PATIENT OUTREACH (OUTPATIENT)
Dept: ADMINISTRATIVE | Facility: HOSPITAL | Age: 59
End: 2024-02-01
Payer: COMMERCIAL

## 2024-07-01 ENCOUNTER — PATIENT OUTREACH (OUTPATIENT)
Dept: ADMINISTRATIVE | Facility: HOSPITAL | Age: 59
End: 2024-07-01
Payer: COMMERCIAL

## 2024-08-27 ENCOUNTER — PATIENT OUTREACH (OUTPATIENT)
Dept: ADMINISTRATIVE | Facility: HOSPITAL | Age: 59
End: 2024-08-27
Payer: COMMERCIAL

## 2024-08-27 DIAGNOSIS — Z12.39 ENCOUNTER FOR SCREENING FOR MALIGNANT NEOPLASM OF BREAST, UNSPECIFIED SCREENING MODALITY: Primary | ICD-10-CM

## 2024-10-22 ENCOUNTER — PATIENT OUTREACH (OUTPATIENT)
Dept: ADMINISTRATIVE | Facility: HOSPITAL | Age: 59
End: 2024-10-22
Payer: COMMERCIAL

## 2024-11-11 ENCOUNTER — OFFICE VISIT (OUTPATIENT)
Dept: INTERNAL MEDICINE | Facility: CLINIC | Age: 59
End: 2024-11-11
Payer: COMMERCIAL

## 2024-11-11 ENCOUNTER — LAB VISIT (OUTPATIENT)
Dept: LAB | Facility: HOSPITAL | Age: 59
End: 2024-11-11
Payer: COMMERCIAL

## 2024-11-11 VITALS
OXYGEN SATURATION: 99 % | WEIGHT: 170.88 LBS | SYSTOLIC BLOOD PRESSURE: 148 MMHG | HEIGHT: 63 IN | BODY MASS INDEX: 30.28 KG/M2 | RESPIRATION RATE: 18 BRPM | HEART RATE: 82 BPM | DIASTOLIC BLOOD PRESSURE: 92 MMHG | TEMPERATURE: 98 F

## 2024-11-11 DIAGNOSIS — E11.9 TYPE 2 DIABETES MELLITUS WITHOUT COMPLICATION, WITHOUT LONG-TERM CURRENT USE OF INSULIN: Primary | ICD-10-CM

## 2024-11-11 DIAGNOSIS — Z90.49 S/P CHOLECYSTECTOMY: ICD-10-CM

## 2024-11-11 DIAGNOSIS — K59.00 CONSTIPATION, UNSPECIFIED CONSTIPATION TYPE: ICD-10-CM

## 2024-11-11 DIAGNOSIS — E11.9 TYPE 2 DIABETES MELLITUS WITHOUT COMPLICATION, WITHOUT LONG-TERM CURRENT USE OF INSULIN: ICD-10-CM

## 2024-11-11 LAB
ALBUMIN SERPL BCP-MCNC: 3.8 G/DL (ref 3.5–5.2)
ALP SERPL-CCNC: 91 U/L (ref 40–150)
ALT SERPL W/O P-5'-P-CCNC: 42 U/L (ref 10–44)
ANION GAP SERPL CALC-SCNC: 12 MMOL/L (ref 8–16)
AST SERPL-CCNC: 27 U/L (ref 10–40)
BILIRUB SERPL-MCNC: 0.6 MG/DL (ref 0.1–1)
BUN SERPL-MCNC: 10 MG/DL (ref 6–20)
CALCIUM SERPL-MCNC: 9.1 MG/DL (ref 8.7–10.5)
CHLORIDE SERPL-SCNC: 108 MMOL/L (ref 95–110)
CO2 SERPL-SCNC: 22 MMOL/L (ref 23–29)
CREAT SERPL-MCNC: 0.7 MG/DL (ref 0.5–1.4)
EST. GFR  (NO RACE VARIABLE): >60 ML/MIN/1.73 M^2
ESTIMATED AVG GLUCOSE: 194 MG/DL (ref 68–131)
GLUCOSE SERPL-MCNC: 167 MG/DL (ref 70–110)
HBA1C MFR BLD: 8.4 % (ref 4–5.6)
POTASSIUM SERPL-SCNC: 3.6 MMOL/L (ref 3.5–5.1)
PROT SERPL-MCNC: 7.3 G/DL (ref 6–8.4)
SODIUM SERPL-SCNC: 142 MMOL/L (ref 136–145)

## 2024-11-11 PROCEDURE — 36415 COLL VENOUS BLD VENIPUNCTURE: CPT | Mod: PO | Performed by: NURSE PRACTITIONER

## 2024-11-11 PROCEDURE — 80053 COMPREHEN METABOLIC PANEL: CPT | Performed by: NURSE PRACTITIONER

## 2024-11-11 PROCEDURE — 83036 HEMOGLOBIN GLYCOSYLATED A1C: CPT | Performed by: NURSE PRACTITIONER

## 2024-11-11 PROCEDURE — 1159F MED LIST DOCD IN RCRD: CPT | Mod: CPTII,S$GLB,, | Performed by: NURSE PRACTITIONER

## 2024-11-11 PROCEDURE — 99999 PR PBB SHADOW E&M-EST. PATIENT-LVL IV: CPT | Mod: PBBFAC,,, | Performed by: NURSE PRACTITIONER

## 2024-11-11 PROCEDURE — 99214 OFFICE O/P EST MOD 30 MIN: CPT | Mod: S$GLB,,, | Performed by: NURSE PRACTITIONER

## 2024-11-11 PROCEDURE — 1160F RVW MEDS BY RX/DR IN RCRD: CPT | Mod: CPTII,S$GLB,, | Performed by: NURSE PRACTITIONER

## 2024-11-11 PROCEDURE — 3080F DIAST BP >= 90 MM HG: CPT | Mod: CPTII,S$GLB,, | Performed by: NURSE PRACTITIONER

## 2024-11-11 PROCEDURE — 3077F SYST BP >= 140 MM HG: CPT | Mod: CPTII,S$GLB,, | Performed by: NURSE PRACTITIONER

## 2024-11-11 PROCEDURE — 3008F BODY MASS INDEX DOCD: CPT | Mod: CPTII,S$GLB,, | Performed by: NURSE PRACTITIONER

## 2024-11-11 RX ORDER — LANCETS 30 GAUGE
EACH MISCELLANEOUS
COMMUNITY
Start: 2024-10-16

## 2024-11-11 RX ORDER — LANCETS 33 GAUGE
EACH MISCELLANEOUS 3 TIMES DAILY
COMMUNITY
Start: 2024-10-16

## 2024-11-11 RX ORDER — INSULIN GLARGINE 100 [IU]/ML
16 INJECTION, SOLUTION SUBCUTANEOUS
COMMUNITY
Start: 2024-10-16 | End: 2024-11-13

## 2024-11-11 RX ORDER — METFORMIN HYDROCHLORIDE 1000 MG/1
1000 TABLET ORAL 2 TIMES DAILY WITH MEALS
Qty: 180 TABLET | Refills: 3 | Status: SHIPPED | OUTPATIENT
Start: 2024-11-11 | End: 2024-11-13

## 2024-11-11 NOTE — PROGRESS NOTES
Subjective:       Patient ID: Elva Rob is a 58 y.o. female.    Chief Complaint: Hospital Follow Up (Gallbladder removal in [place]; c/o soreness at incision & want drip line checked) and Diabetes (Started new meds )      History of Present Illness    CHIEF COMPLAINT:  Ms. Rob presents today for follow-up after gallbladder removal surgery.    RECENT GALLBLADDER SURGERY:  She recently underwent gallbladder removal surgery in [place] during a conference, where the gallbladder was found to be gangrenous. She had a follow-up visit where the drain was removed. She expresses concerns about wound healing and reports completing the prescribed course of antibiotics. She notes occasional pinching sensations at the top incision site and is seeking a thorough exam of the surgical site to ensure proper healing and closure of incisions.    GASTROINTESTINAL:  She reports experiencing constipation, with bowel movements occurring approximately every other day, and notes a change in stool consistency. She denies experiencing nausea or bloating since surgery. She notes stomach rumbling at night, which she attributes to reduced food intake.    DIABETES MANAGEMENT:  She reports previously unmanaged diabetes, now on a medication regimen. She is currently taking long-acting insulin before bedtime and Metformin 500 mg twice daily. She reports adherence to the medication schedule with minimal issues. She notes experiencing small, mosquito bite-like bumps at insulin injection sites on her abdomen. Blood sugar readings are being tracked via an silvestre, which she feels is going well. Her recent A1C level was 10%.     MEDICAL HISTORY:  She denies history of thyroid cancer or pancreatitis. She reports a history of kidney stones over 25 years ago, but no recent problems. She denies recent urinary tract infections or yeast infections.    Review of patient's allergies indicates:  No Known Allergies    Medication List with Changes/Refills    New Medications    EMPAGLIFLOZIN (JARDIANCE) 10 MG TABLET    Take 1 tablet (10 mg total) by mouth once daily.   Current Medications    BLOOD SUGAR DIAGNOSTIC STRP    1 strip by Misc.(Non-Drug; Combo Route) route 2 (two) times daily. Please provide items covered by patient's insurance    BLOOD-GLUCOSE METER KIT    1 each by Other route once. Use as instructed. Provide meter covered by patient's insurance for 1 dose    LANTUS SOLOSTAR U-100 INSULIN 100 UNIT/ML (3 ML) INPN PEN    Inject 16 Units into the skin.    ONETOUCH DELICA PLUS LANCET 33 GAUGE MISC    3 (three) times daily. Test Blood Sugar    ONETOUCH VERIO FLEX METER MISC    use as directed   Changed and/or Refilled Medications    Modified Medication Previous Medication    METFORMIN (GLUCOPHAGE) 1000 MG TABLET metFORMIN (GLUCOPHAGE) 500 MG tablet       Take 1 tablet (1,000 mg total) by mouth 2 (two) times daily with meals.    Take 1 tablet (500 mg total) by mouth 2 (two) times daily with meals.   Discontinued Medications    AZELASTINE (ASTELIN) 137 MCG (0.1 %) NASAL SPRAY    1 spray (137 mcg total) by Nasal route 2 (two) times daily. for 10 days    BENZONATATE (TESSALON) 200 MG CAPSULE    Take 1 capsule (200 mg total) by mouth 3 (three) times daily as needed for Cough.    FLUTICASONE PROPIONATE (FLONASE) 50 MCG/ACTUATION NASAL SPRAY    1 spray (50 mcg total) by Each Nostril route 2 (two) times daily.    LANCETS MISC    1 lancet by Misc.(Non-Drug; Combo Route) route 2 (two) times daily.    PROMETHAZINE-DEXTROMETHORPHAN (PROMETHAZINE-DM) 6.25-15 MG/5 ML SYRP    Take 5 mLs by mouth nightly as needed (cough).     Review of Systems   Constitutional:  Negative for chills and fever.   Respiratory:  Negative for cough and shortness of breath.    Cardiovascular:  Negative for chest pain.   Gastrointestinal:  Positive for abdominal pain and constipation.   Neurological:  Negative for dizziness and headaches.      Objective:   BP (!) 148/92 (BP Location: Left arm,  "Patient Position: Sitting)   Pulse 82   Temp 97.9 °F (36.6 °C) (Temporal)   Resp 18   Ht 5' 3" (1.6 m)   Wt 77.5 kg (170 lb 13.7 oz)   SpO2 99%   BMI 30.27 kg/m²     Physical Exam  Vitals reviewed.   Constitutional:       Appearance: Normal appearance.   HENT:      Head: Normocephalic and atraumatic.   Pulmonary:      Effort: Pulmonary effort is normal.   Abdominal:      General: Bowel sounds are normal.      Palpations: Abdomen is soft.      Tenderness: There is no abdominal tenderness.      Comments: Healed laparoscopic sites to abdomen   Skin:     General: Skin is warm and dry.   Neurological:      Mental Status: She is alert and oriented to person, place, and time.       Assessment and Plan:     1. Type 2 diabetes mellitus without complication, without long-term current use of insulin (Primary)      Start Jardiance 10 mg daily in the morning.  Increased metformin to 1000 mg twice daily.  Discontinued long-acting insulin (Lantus).    - empagliflozin (JARDIANCE) 10 mg tablet; Take 1 tablet (10 mg total) by mouth once daily.  Dispense: 30 tablet; Refill: 11  - Hemoglobin A1C; Future  - COMPREHENSIVE METABOLIC PANEL; Future  - metFORMIN (GLUCOPHAGE) 1000 MG tablet; Take 1 tablet (1,000 mg total) by mouth 2 (two) times daily with meals.  Dispense: 180 tablet; Refill: 3    2. S/P cholecystectomy    3. Constipation, unspecified constipation type    Explained potential causes of constipation post-gallbladder surgery.  Ms. Rob to take a stool softener to address constipation.  Ms. Rob to maintain adequate hydration.  Ms. Rob to report if bowel movements do not return to normal.              This note was generated with the assistance of ambient listening technology. Verbal consent was obtained by the patient and accompanying visitor(s) for the recording of patient appointment to facilitate this note. I attest to having reviewed and edited the generated note for accuracy, though some syntax or spelling " errors may persist. Please contact the author of this note for any clarification.

## 2024-11-13 ENCOUNTER — HOSPITAL ENCOUNTER (OUTPATIENT)
Dept: RADIOLOGY | Facility: HOSPITAL | Age: 59
Discharge: HOME | End: 2024-11-13
Attending: INTERNAL MEDICINE
Payer: COMMERCIAL

## 2024-11-13 ENCOUNTER — OFFICE VISIT (OUTPATIENT)
Dept: INTERNAL MEDICINE | Facility: CLINIC | Age: 59
End: 2024-11-13
Payer: COMMERCIAL

## 2024-11-13 VITALS
HEART RATE: 88 BPM | BODY MASS INDEX: 30.82 KG/M2 | DIASTOLIC BLOOD PRESSURE: 76 MMHG | OXYGEN SATURATION: 98 % | SYSTOLIC BLOOD PRESSURE: 136 MMHG | HEIGHT: 63 IN | WEIGHT: 173.94 LBS

## 2024-11-13 DIAGNOSIS — R19.7 DIARRHEA, UNSPECIFIED TYPE: ICD-10-CM

## 2024-11-13 DIAGNOSIS — E11.9 TYPE 2 DIABETES MELLITUS WITHOUT COMPLICATION, WITHOUT LONG-TERM CURRENT USE OF INSULIN: Primary | ICD-10-CM

## 2024-11-13 DIAGNOSIS — Z12.39 ENCOUNTER FOR SCREENING FOR MALIGNANT NEOPLASM OF BREAST, UNSPECIFIED SCREENING MODALITY: ICD-10-CM

## 2024-11-13 PROCEDURE — 3078F DIAST BP <80 MM HG: CPT | Mod: CPTII,S$GLB,,

## 2024-11-13 PROCEDURE — 77067 SCR MAMMO BI INCL CAD: CPT | Mod: TC,PO

## 2024-11-13 PROCEDURE — 99215 OFFICE O/P EST HI 40 MIN: CPT | Mod: S$GLB,,,

## 2024-11-13 PROCEDURE — 3052F HG A1C>EQUAL 8.0%<EQUAL 9.0%: CPT | Mod: CPTII,S$GLB,,

## 2024-11-13 PROCEDURE — 1160F RVW MEDS BY RX/DR IN RCRD: CPT | Mod: CPTII,S$GLB,,

## 2024-11-13 PROCEDURE — 1159F MED LIST DOCD IN RCRD: CPT | Mod: CPTII,S$GLB,,

## 2024-11-13 PROCEDURE — 99999 PR PBB SHADOW E&M-EST. PATIENT-LVL IV: CPT | Mod: PBBFAC,,,

## 2024-11-13 PROCEDURE — 3008F BODY MASS INDEX DOCD: CPT | Mod: CPTII,S$GLB,,

## 2024-11-13 PROCEDURE — 3075F SYST BP GE 130 - 139MM HG: CPT | Mod: CPTII,S$GLB,,

## 2024-11-13 RX ORDER — METFORMIN HYDROCHLORIDE 500 MG/1
TABLET, EXTENDED RELEASE ORAL
Qty: 60 TABLET | Refills: 3 | Status: SHIPPED | OUTPATIENT
Start: 2024-11-13

## 2024-11-13 RX ORDER — BLOOD-GLUCOSE SENSOR
1 EACH MISCELLANEOUS
Qty: 3 EACH | Refills: 11 | Status: SHIPPED | OUTPATIENT
Start: 2024-11-13 | End: 2025-11-13

## 2024-11-13 RX ORDER — INSULIN GLARGINE 100 [IU]/ML
16 INJECTION, SOLUTION SUBCUTANEOUS DAILY
COMMUNITY

## 2024-11-13 RX ORDER — METFORMIN HYDROCHLORIDE 500 MG/1
500 TABLET ORAL 2 TIMES DAILY WITH MEALS
COMMUNITY
End: 2024-11-13

## 2024-11-13 RX ORDER — BLOOD-GLUCOSE SENSOR
1 EACH MISCELLANEOUS
Qty: 3 EACH | Refills: 11 | Status: SHIPPED | OUTPATIENT
Start: 2024-11-13 | End: 2024-11-13

## 2024-11-13 NOTE — PATIENT INSTRUCTIONS
Stop current strength Metformin     Keep a log when diarrhea occurs.     Start Metformin ER 500mg tablet~ 1 tablet with dinner x 1 week and increase dose to 1 tablet with breakfast and dinner as tolerates.   If you experience diarrhea with twice daily dose, then decrease dose back to 1 tablet with dinner.    Decrease Basaglar to 12 units at bedtime and monitor your blood sugars in the morning closely. Long as your morning numbers are staying in goal range (<130) for 3 consecutive days, you can decrease Basaglar by 2 units every 3 nights.    Blood glucose goals:   Fastening ()  2 hours after meals (<180)     Continue to hold Jardiance until next f/u.      your Dexcom G7 from your pharmacy and share your readings with the clinic.     Continue current diet and exercise regimen.    F/u in 4 weeks

## 2024-11-13 NOTE — PROGRESS NOTES
CHIEF COMPLAINT: Type 2 Diabetes    Referred by PCP: Dr. Saldana  Previously managed by: Dr. Saldana  CDE person of contact (if needed): Kami Morton     HPI: Mrs. Elva Rob is a 58 y.o. female who was diagnosed with Type 2 DM in 3-4 yrs ago.   Last A1c: 8.4% (11/2024)  Reports DM previously managed with lifestyle changes. Recently traveled to [place] and had an emergency cholecystectomy in mid October. Her BG was known to be 399 during the time. She was started on insulin and Metformin during hospitalization and now  tracks her BG using a glucometer. Reports prescribed Jardiance 1w ago, and metformin dose increased, but has not picked up new Rxs.  Reports diarrhea often.  Initially BG started in 200s, now 7-day av 124. FBG low 100s. Reports eye exam this morning and has been referred to [facility] for further eval.   BG monitoring:   Name: One touch verio flex  How often: 2-3 xs daily  BG range: 102-199(x1)  Hypoglycemia: No     LIFESTYLE:   Diet: Has made changes; eats 3 meals daily with healthy snacks and  increased  water intake.   Exercise: Walks often on the treadmill at home, in the neighborhood, and while in public.        Hx of hospitalization for DM? No   Hx of HTN? No   Hx of CKD? No   Hx of CHF? No  Hx of CVA? No   Hx of MI? No   Hx of Pancreatitis? No   Hx of DM neuropathy? No   Hx DR? No   Hx of Long term steroid use? No         PREVIOUS DIABETES MEDICATIONS TRIED  NONE    CURRENT DIABETIC MEDS: Metformin 500 mg twice daily, and Basaglar 16u HS.      Diabetes Management Status:  Statin: Not taking  ACE/ARB: Not taking          Screening or Prevention Patient's value Goal Complete/Controlled?   HgA1C Testing and Control   Lab Results   Component Value Date    HGBA1C 8.4 (H) 11/11/2024      Annually/Less than 8% No   Lipid profile : 02/08/2022 Annually No   LDL control Lab Results   Component Value Date    LDLCALC 153.0 02/08/2022    Annually/Less than 100 mg/dl  No   Nephropathy screening No  "results found for: "LABMICR"  Lab Results   Component Value Date    PROTEINUA Negative 02/08/2022    Annually No   Blood pressure BP Readings from Last 1 Encounters:   11/13/24 136/76    Less than 140/90 No   Dilated retinal exam Completed today (11/13/2024). Referred to [facility] for further eval.  Annually Yes   Foot exam   UTD, completed by PCP. Annually Yes     REVIEW OF SYSTEMS  General: Denies weakness, fatigue, or weight changes.   Eyes: Denies  eye pain, or redness. Positive slight blurred vision  Cardiovascular: Denies CP, palpitations, or edema.   Respiratory: Denies cough or dyspnea.   GI: Denies heartburn, n/v, or constipation: Positive diarrhea.  Skin: Denies rash, lesions, itching, or injection site problems.  Neuro: Denies severe HAs, numbness, tingling, tremors, or vertigo.   Endocrine: Denies polyuria, polydipsia, polyphagia, heat or cold intolerance.     Vital Signs  Vitals:    11/13/24 1414   BP: 136/76   Pulse: 88        Hemoglobin A1C   Date Value Ref Range Status   11/11/2024 8.4 (H) 4.0 - 5.6 % Final     Comment:     ADA Screening Guidelines:  5.7-6.4%  Consistent with prediabetes  >or=6.5%  Consistent with diabetes    High levels of fetal hemoglobin interfere with the HbA1C  assay. Heterozygous hemoglobin variants (HbS, HgC, etc)do  not significantly interfere with this assay.   However, presence of multiple variants may affect accuracy.     02/08/2022 12.2 (H) 4.0 - 5.6 % Final     Comment:     ADA Screening Guidelines:  5.7-6.4%  Consistent with prediabetes  >or=6.5%  Consistent with diabetes    High levels of fetal hemoglobin interfere with the HbA1C  assay. Heterozygous hemoglobin variants (HbS, HgC, etc)do  not significantly interfere with this assay.   However, presence of multiple variants may affect accuracy.          Chemistry        Component Value Date/Time     11/11/2024 0941    K 3.6 11/11/2024 0941     11/11/2024 0941    CO2 22 (L) 11/11/2024 0941    BUN 10 " 11/11/2024 0941    CREATININE 0.7 11/11/2024 0941     (H) 11/11/2024 0941        Component Value Date/Time    CALCIUM 9.1 11/11/2024 0941    ALKPHOS 91 11/11/2024 0941    AST 27 11/11/2024 0941    ALT 42 11/11/2024 0941    BILITOT 0.6 11/11/2024 0941    ESTGFRAFRICA >60.0 02/08/2022 1146    EGFRNONAA >60.0 02/08/2022 1146           Lab Results   Component Value Date    TSH 0.772 02/08/2022      Lab Results   Component Value Date    CHOL 225 (H) 02/08/2022     Lab Results   Component Value Date    HDL 45 02/08/2022     Lab Results   Component Value Date    LDLCALC 153.0 02/08/2022     Lab Results   Component Value Date    TRIG 135 02/08/2022     Lab Results   Component Value Date    CHOLHDL 20.0 02/08/2022         PHYSICAL EXAMINATION  Constitutional: Appears well, no distress.  Eyes: Sclera white, PERRLA, EOMs intact.  Neck: Supple, trachea midline.   Respiratory: No cough, SOB, nor AMARO.  Cardiovascular: RRR; no edema.  Skin: Warm and dry; no rash, lesions, acanthosis nigricans, nor injection site reactions.  Neuro: AAOx4, steady gait  Psychiatric: No unusual, disruptive, or inappropriate behavior.     Diabetes Foot Exam:   deferred        Assessment/Plan  1. Type 2 diabetes mellitus without complication, without long-term current use of insulin  metFORMIN (GLUCOPHAGE-XR) 500 MG ER 24hr tablet    blood-glucose sensor (DEXCOM G7 SENSOR) Janel    DISCONTINUED: blood-glucose sensor (DEXCOM G7 SENSOR) Janel    -A1c goal <7%   -Goal to wean insulin.  -Cont current diet and exercise regimen.     -Blood glucose goals:   Fastening ()  2 hours after meals (<180)     -Stop current strength Metformin     -Start Metformin ER 500mg tablet~ 1 tablet with dinner x 1 week and increase dose to 1 tablet with breakfast and dinner as tolerates.     -Decrease Basaglar to 12 units at bedtime. May further decrease Basaglar by 2 units every 3 nights if FBG remains low 100s.    -Continue to hold Jardiance until next f/u.      -Applied Dexcom G7 sample. Set up Dexcom silvestre on pt's phone. Educated pt on how to apply sensor and share readings with the clinic, she vu.    -Review CGM readings upon next visit, start Jardiance if needed.       2. Diarrhea, unspecified type  Recommend keep a diarrhea log.  Metformin changed to Metformin ER  Re-eval symptoms after starting Metformin ER.            FOLLOW UP  Follow up in about 4 weeks (around 12/11/2024).

## 2024-11-14 ENCOUNTER — TELEPHONE (OUTPATIENT)
Dept: INTERNAL MEDICINE | Facility: CLINIC | Age: 59
End: 2024-11-14
Payer: COMMERCIAL

## 2024-11-14 ENCOUNTER — PATIENT MESSAGE (OUTPATIENT)
Dept: INTERNAL MEDICINE | Facility: CLINIC | Age: 59
End: 2024-11-14
Payer: COMMERCIAL

## 2024-11-14 DIAGNOSIS — E11.9 TYPE 2 DIABETES MELLITUS WITHOUT COMPLICATION, WITHOUT LONG-TERM CURRENT USE OF INSULIN: Primary | ICD-10-CM

## 2024-11-14 NOTE — TELEPHONE ENCOUNTER
----- Message from CASSIE Dietz sent at 11/14/2024 10:58 AM CST -----  Regarding: Contact patient  Please call and give patient the correct clinic code to share her Dexcom data.   Thanks,  Wellington

## 2024-11-19 ENCOUNTER — TELEPHONE (OUTPATIENT)
Dept: INTERNAL MEDICINE | Facility: CLINIC | Age: 59
End: 2024-11-19
Payer: COMMERCIAL

## 2024-11-19 ENCOUNTER — PATIENT MESSAGE (OUTPATIENT)
Dept: RESEARCH | Facility: HOSPITAL | Age: 59
End: 2024-11-19
Payer: COMMERCIAL

## 2024-11-19 RX ORDER — INSULIN GLARGINE 100 [IU]/ML
16 INJECTION, SOLUTION SUBCUTANEOUS DAILY
Qty: 15 ML | Refills: 3 | Status: SHIPPED | OUTPATIENT
Start: 2024-11-19

## 2024-11-19 NOTE — TELEPHONE ENCOUNTER
Patient informed that authorization for diabetes supplies have been approved by provider via [email or web address]. Patient voiced understanding.  ----- Message from May sent at 11/19/2024  1:07 PM CST -----  Contact: [phone]  Patient would like call back Re: diabetes education. DME [phone]   needs patient's medical records and your signature. Patient states her insurance contact  Abhishek @ [phone] ext [phone]  [id number]  also informed the company that authorization was not need.  Please call and advise.    Thank you and have a great day.

## 2024-12-05 ENCOUNTER — NURSE TRIAGE (OUTPATIENT)
Dept: ADMINISTRATIVE | Facility: CLINIC | Age: 59
End: 2024-12-05
Payer: COMMERCIAL

## 2024-12-05 ENCOUNTER — HOSPITAL ENCOUNTER (EMERGENCY)
Facility: HOSPITAL | Age: 59
Discharge: HOME | End: 2024-12-06
Attending: EMERGENCY MEDICINE
Payer: COMMERCIAL

## 2024-12-05 DIAGNOSIS — H53.9 VISUAL CHANGES: ICD-10-CM

## 2024-12-05 DIAGNOSIS — H43.10 VITREOUS HEMORRHAGE, UNSPECIFIED LATERALITY: Primary | ICD-10-CM

## 2024-12-05 PROCEDURE — 99282 EMERGENCY DEPT VISIT SF MDM: CPT

## 2024-12-06 ENCOUNTER — TELEPHONE (OUTPATIENT)
Dept: OPHTHALMOLOGY | Facility: CLINIC | Age: 59
End: 2024-12-06
Payer: COMMERCIAL

## 2024-12-06 VITALS
SYSTOLIC BLOOD PRESSURE: 154 MMHG | HEIGHT: 63 IN | RESPIRATION RATE: 14 BRPM | BODY MASS INDEX: 30.82 KG/M2 | WEIGHT: 173.94 LBS | HEART RATE: 80 BPM | DIASTOLIC BLOOD PRESSURE: 85 MMHG | OXYGEN SATURATION: 99 % | TEMPERATURE: 98 F

## 2024-12-06 NOTE — CONSULTS
"Consultation Report  Ophthalmology Service    Date: 12/06/2024    Reason for Consult: "floaters"     History of Present Illness: Elva Rob is a 58 y.o. female with PMH of uncontrolled previously but recently controlled T2DM, HTN, who presents to [facility] ED for few hours of floaters which started in the right eye at 8:30 this evening. Pt states she was cooking dinner and noticed floaters and mild blurriness in the right eye, described as "like having hand  with spots over her vision, appears fine/thin, with red-like coloration, in temporal visual field and inferior visual field". Denies flashes of light, curtains, veils, pain, diplopia. Has never had this happen before. Of note, pt has been following at [facility] annually for diabetic exam via tele-exam per her description. Was seen 2 weeks ago there and was not told her exam was abnormal but was referred to ophthalmologist to take a deeper look into her eye. Denies any vision changes or problems in the left eye.       POcularHx: Denies history of ocular problems or past ocular surgeries.    Current eye gtts: Denies     Family Hx: Denies family history of glaucoma, macular degeneration, or blindness. family history includes Breast cancer (age of onset: 55) in her mother; Diabetes in her mother; Gestational diabetes in her sister; Hypertension in her father; Prostate cancer in her paternal uncle; Thyroid disease in her maternal aunt.     PMHx:  has a past medical history of Nephrolithiasis.     PSurgHx:  has no past surgical history on file.     Home Medications:   Prior to Admission medications    Medication Sig Start Date End Date Taking? Authorizing Provider   blood sugar diagnostic Strp 1 strip by Misc.(Non-Drug; Combo Route) route 2 (two) times daily. Please provide items covered by patient's insurance 2/8/22   Bebe Saldana MD   blood-glucose sensor (DEXCOM G7 SENSOR) Janel 1 Device by Misc.(Non-Drug; Combo Route) route every 10 days. 11/13/24 " 11/13/25  Wellington Araujo FNP-C   empagliflozin (JARDIANCE) 10 mg tablet Take 1 tablet (10 mg total) by mouth once daily. 11/11/24   Crystal Reynolds, NP   insulin glargine U-100, Lantus, (BASAGLAR KWIKPEN U-100 INSULIN) 100 unit/mL (3 mL) InPn pen Inject 16 Units into the skin once daily. 11/19/24   Wellington Araujo FNP-C   metFORMIN (GLUCOPHAGE-XR) 500 MG ER 24hr tablet Start taking 1 tablet with dinner for 3 days and increase to 1 tablet with breakfast and 1 tablet with dinner as tolerates. 11/13/24   Wellington Araujo FNP-C   ONETOUCH DELICA PLUS LANCET 33 gauge Misc 3 (three) times daily. Test Blood Sugar 10/16/24   Provider, Historical   ONETOUCH VERIO FLEX METER Misc use as directed 10/16/24   Provider, Historical        Medications this encounter:     Allergies: has No Known Allergies.     Social:  reports that she has never smoked. She has never used smokeless tobacco. She reports that she does not currently use alcohol. She reports that she does not use drugs.     ROS: As per HPI    Ocular examination/Dilated fundus examination:  Base Eye Exam       Visual Acuity (Snellen - Linear)         Right Left    Dist sc 20/30 -1 20/25    Dist ph sc 20/25 -2               Tonometry (Tonopen, 1:39 AM)         Right Left    Pressure 17 15              Pupils         Pupils Dark Light Shape React APD    Right PERRL 4 2 Round Brisk None    Left PERRL 4 2 Round Brisk None              Visual Fields         Right Left     Full Full              Extraocular Movement         Right Left     Full, Ortho Full, Ortho              Neuro/Psych       Oriented x3: Yes    Mood/Affect: Normal              Dilation       Both eyes: 1% Mydriacyl, 2.5% Phenylephrine @ 1:39 AM                  Slit Lamp and Fundus Exam       External Exam         Right Left    External Normal Normal              Slit Lamp Exam         Right Left    Lids/Lashes Normal Normal    Conjunctiva/Sclera White and quiet White and quiet    Cornea  Clear Clear    Anterior Chamber Deep and quiet Deep and quiet    Iris Round and reactive, no NVI Round and reactive, no NVI    Lens 1-2+ NSC 1-2+ NSC    Anterior Vitreous vitreous hemorrhage Normal              Fundus Exam         Right Left    Disc poor view given vit heme overlying, but appears pink and sharp without visible NVE Normal, no NVD    C/D Ratio  0.3    Macula CWS, exudates, few DBH normal pigmentation, flat    Vessels Normal Normal    Periphery scattered CWS, DBH, HEs, 2 inferior small vitreous hemorrhages, consolidated vitreous hemorrhage eminating off superonasal midperiphery extending anteriorly overhanging ONH which is attached to by thin stalk adjacent to vessel without NVE, no obvious NVE, NVD, no holes tears, or detachments 360 Small flame hemorrhage near disc inf, scattered HEs, few CWS, few DBHs, flat, no, holes, tears, detachments                      Assessment/Plan:     # Vitreous Hemorrhage, right eye   - Patient with acute vitreous hemorrhage  - Medical history consists of HTN, and previously uncontrolled DM (A1c previously 12, 10.2% few months ago, 1 month ago 8.4, no 6.9%.   - On medications metformin  - No hx of trauma to eye  - No neovascularization of the iris seen.   - Red reflex is present. DFE with CWS, few DBH, consolidated vitreous hemorrhage overlying nerve    # Moderate NPDR vs Grade 3 Hypertensive retinopathy OU   - both eyes with scattered CWS, Hard exudates, few DBHs. OS with one small flame hemorrhage near inf disc  - right eye with vitreous hemorrhage as above, without any obvious NVE, NVD  - see recs below    Recommendations:  - Recommend laying at 30 degree angle. No heavy lifting, straining, bending, keep HOB elevated.  - Avoid additional NSAIDs or aspirin unless medically indicated by other providers   - BG/BP control per PCP, discussed importance with patient  - Discussed with patient that vision may get worse before gets better as blood diffuses through vitreous.     - Retinal detachment return precautions given  - Patient will be scheduled to be seen in Retina clinic in 1-2 weeks      Patient's Best Contact Number: [phone]     Jose Pickett MD   [facility] PGY2  12/06/2024  2:59 AM        Common Ophthalmologic Abbreviations  OD: right eye  OS: left eye  OU: both eyes  IOP: intraocular pressure  VA: visual acuity  PH: pinhole  HM: hand motion  LP: light perception  NLP: no light perception  DFE: dilated fundus examination  SLE: slit lamp examination  RD: retinal detachment   AT: artificial tears  PFAT: preservative free artificial tears

## 2024-12-06 NOTE — DISCHARGE INSTRUCTIONS
Avoid NSAIDs like ibuprofen, advil or aspirin  Keep the head of your bed elevated  Follow up in Retina Clinic, call the number above  Return if symptoms worsen

## 2024-12-06 NOTE — TELEPHONE ENCOUNTER
----- Message from Jose Pickett sent at 12/6/2024  3:07 AM CST -----  Regarding: Follow up from ED  Hello,    This patient was seen as an inpatient for vitreous hemorhage right eye. Can they please get follow up in retina clinic in 1-2 weeks?     Best contact number [phone]     Please let me know if unable to schedule the patient.     Thank you!     Jose Pickett MD  [facility] PGY2

## 2024-12-06 NOTE — TELEPHONE ENCOUNTER
"Patient reports she noticed redness to her right eye before calling. Patient denies any pain.PAtient reports "a web " to eye with a "red dot". Patient denies injury, FB, or discharge to eye. Patient states she does have blurry vision to right eye only. Denies any eyelid swelling. Advised patient of dispo to see HCP within 4 hours. Verbalized understanding. Advised to call back if symptoms become worse or with further questions.      Reason for Disposition   New or worsening blurred vision    Additional Information   Negative: SEVERE eye pain (e.g., excruciating pain and patient unable to do normal activities)   Negative: [1] Eyelid is very swollen (shut or almost) AND [2] fever   Negative: [1] Eyelid (outer) is very red (or tender to touch) AND [2] fever   Negative: [1] Foreign body sensation ("feels like something is in there") AND [2] no improvement after irrigation (regardless of duration of flushing)   Negative: Vomiting   Negative: [1] Recent eye surgery AND [2] increasing eye pain   Negative: Patient sounds very sick or weak to the triager   Negative: MODERATE eye pain or discomfort (e.g., interferes with normal activities or awakens from sleep; more than mild)    Protocols used: Eye - Redness-A-AH    "

## 2024-12-06 NOTE — ED PROVIDER NOTES
"Encounter Date: 12/5/2024       History     Chief Complaint   Patient presents with    Eye Problem     Pt reports around 8:30 pm feeling "something burst" in her R eye. +"cloudy vision"     HPI    Patient is a 58-year-old female with past medical history hyperlipidemia, diabetes that is presenting for right eye vision change.  The patient denies any pain.  The patient states that she was cooking at 8:30 p.m. when patient had sudden onset right eye peripheral visual changes, states that she had a crusted appearing floater appear that is persistent in her right visual field on her right eye.  Patient otherwise denies any other vision changes, patient denies any visual acuity loss or change.  Patient denies any symptoms with left eye.  The patient denies any fevers, chills, chest pains, shortness of breath, nausea, vomiting, diarrhea, weakness, sensation changes.    Review of patient's allergies indicates:  No Known Allergies  Past Medical History:   Diagnosis Date    Nephrolithiasis      History reviewed. No pertinent surgical history.  Family History   Problem Relation Name Age of Onset    Breast cancer Mother  55    Diabetes Mother      Hypertension Father      Gestational diabetes Sister      Prostate cancer Paternal Uncle      Thyroid disease Maternal Aunt       Social History     Tobacco Use    Smoking status: Never    Smokeless tobacco: Never   Substance Use Topics    Alcohol use: Not Currently     Comment: occasionaltl    Drug use: Never     Review of Systems   Constitutional:         No other system positives other than aforementioned as reported by patient       Physical Exam     Initial Vitals [12/05/24 2247]   BP Pulse Resp Temp SpO2   (!) 163/90 87 18 97.9 °F (36.6 °C) 100 %      MAP       --         Physical Exam    Vitals reviewed.  Constitutional: She appears well-developed and well-nourished. She is not diaphoretic. No distress.   Well-appearing 58-year-old female, no distress, appropriately " conversation   HENT:   Head: Normocephalic and atraumatic.   Eyes: EOM are normal. Pupils are equal, round, and reactive to light.   PERRL bilaterally, extraocular movements intact.  Patient denies any central visual change bilaterally.  Bedside ultrasound of right eye shows possible vitreous hemorrhage.   Cardiovascular:  Normal rate, regular rhythm, normal heart sounds and intact distal pulses.     Exam reveals no gallop and no friction rub.       No murmur heard.  Pulmonary/Chest: Breath sounds normal. No respiratory distress. She has no wheezes. She has no rales.     Neurological: She is alert and oriented to person, place, and time. No cranial nerve deficit. GCS score is 15. GCS eye subscore is 4. GCS verbal subscore is 5. GCS motor subscore is 6.   Skin: Skin is warm and dry. No rash noted. No erythema. No pallor.         ED Course   Procedures  Labs Reviewed - No data to display         Imaging Results    None          Medications - No data to display  Medical Decision Making  1. Differential Diagnosis includes:  Vitreous hemorrhage, retinal detachment      2. Co Morbidities include:  Elderly   Increased patient risk:  History of diabetes      3. External notes reviewed:  Clinic note      4. History sources independently obtained include: n/a      5. Discussion of management with:  Ophthalmology      6. Independent intrepretation of tests include: n/a      7. Diagnostic tests or therapies considered but not ordered: n/a      8. Social determinants of health: n/a      9. Shared decision making includes:  Patient is a 58-year-old female presenting for floaters in the right eye.  Bedside ultrasound concerning for possible vitreous hemorrhage.  Ophthalmology was consulted.  Patient has been handed over to overnight team pending recommendations by Ophthalmology.               ED Course as of 12/07/24 1017   Fri Dec 06, 2024   0216 Ophthalmology evaluated the patient and agreed that the patient has a vitreous  hemorrhage.  They recommended avoiding NSAIDs, avoiding aspirin, keeping the head of bed elevated, following up and retina clinic in 2-3 weeks.  Patient is comfortable with the plan and stable for discharge [BS]      ED Course User Index  [BS] Fadi Villavicencio MD                           Clinical Impression:  Final diagnoses:  [H53.9] Visual changes  [H43.10] Vitreous hemorrhage, unspecified laterality (Primary)          ED Disposition Condition    Discharge Stable          ED Prescriptions    None       Follow-up Information       Follow up With Specialties Details Why Contact Info Additional Information    [facility] - [address] [place] [address] Ophthalmology Call in 1 day To set up a follow-up appointment to discuss your vitreous hemorrhage [id number]  [facility] [id number] [phone] Please arrive on the 10th floor for check-in.             Justus Engel MD  12/06/24 0138       Justus Engel MD  12/06/24 0139       Justus Engel MD  12/07/24 1017

## 2024-12-11 ENCOUNTER — OFFICE VISIT (OUTPATIENT)
Dept: INTERNAL MEDICINE | Facility: CLINIC | Age: 59
End: 2024-12-11
Payer: COMMERCIAL

## 2024-12-11 VITALS
HEIGHT: 63 IN | DIASTOLIC BLOOD PRESSURE: 78 MMHG | OXYGEN SATURATION: 96 % | WEIGHT: 168 LBS | HEART RATE: 74 BPM | SYSTOLIC BLOOD PRESSURE: 130 MMHG | BODY MASS INDEX: 29.77 KG/M2

## 2024-12-11 DIAGNOSIS — E11.65 TYPE 2 DIABETES MELLITUS WITH HYPERGLYCEMIA, WITHOUT LONG-TERM CURRENT USE OF INSULIN: Primary | ICD-10-CM

## 2024-12-11 DIAGNOSIS — Z97.8 USES SELF-APPLIED CONTINUOUS GLUCOSE MONITORING DEVICE: ICD-10-CM

## 2024-12-11 PROCEDURE — 1160F RVW MEDS BY RX/DR IN RCRD: CPT | Mod: CPTII,S$GLB,,

## 2024-12-11 PROCEDURE — 99999 PR PBB SHADOW E&M-EST. PATIENT-LVL IV: CPT | Mod: PBBFAC,,,

## 2024-12-11 PROCEDURE — 3052F HG A1C>EQUAL 8.0%<EQUAL 9.0%: CPT | Mod: CPTII,S$GLB,,

## 2024-12-11 PROCEDURE — 3078F DIAST BP <80 MM HG: CPT | Mod: CPTII,S$GLB,,

## 2024-12-11 PROCEDURE — 99214 OFFICE O/P EST MOD 30 MIN: CPT | Mod: S$GLB,,,

## 2024-12-11 PROCEDURE — 1159F MED LIST DOCD IN RCRD: CPT | Mod: CPTII,S$GLB,,

## 2024-12-11 PROCEDURE — 3008F BODY MASS INDEX DOCD: CPT | Mod: CPTII,S$GLB,,

## 2024-12-11 PROCEDURE — 3075F SYST BP GE 130 - 139MM HG: CPT | Mod: CPTII,S$GLB,,

## 2024-12-11 RX ORDER — PEN NEEDLE, DIABETIC 32 GX 1/4"
NEEDLE, DISPOSABLE MISCELLANEOUS
COMMUNITY
Start: 2024-10-16

## 2024-12-11 NOTE — PROGRESS NOTES
"CHIEF COMPLAINT: Type 2 Diabetes    Referred by PCP: Dr. Saldana  Previously managed by: Dr. Saldana  CDE person of contact (if needed): Kami Morton     HPI: Mrs. Elva Rob is a 58 y.o. female who was diagnosed with Type 2 DM in 3-4 yrs ago.   Hx of hospitalization for DM? No   Hx of HTN? No   Hx of CKD? No   Hx of CHF? No  Hx of CVA? No   Hx of MI? No   Hx of Pancreatitis? No   Hx of DM neuropathy? No   Hx DR? No   Hx of Long term steroid use? No   No statin~ HX elevated liver enzymes.    Last A1c: 8.4% (11/2024)  Reports Dx vitreous hemorrhage to right eye recently. She's not able to tolerate Metformin XR BID.   Has Dexcom G7, BG av: 141. Denies hypoglycemia. Jardiance still on hold, reports pharmacy cancelled Jardiance Rx. Would like to try Metformin holiday.       LIFESTYLE:   Diet: 3 meals daily with healthy snacks and  increased  water intake.   Exercise: Walks often on the treadmill at home, in the neighborhood, and while in public.        Previous Diabetes Meds tried:  None    Current Diabetic Meds:   Metformin XR 500mg with dinner and Basaglar 12 HS.      Diabetes Management Status:  Statin: Not taking  ACE/ARB: Not taking          Screening or Prevention Patient's value Goal Complete/Controlled?   HgA1C Testing and Control   Lab Results   Component Value Date    HGBA1C 8.4 (H) 11/11/2024      Annually/Less than 8% No   Lipid profile : 02/08/2022 Annually No   LDL control Lab Results   Component Value Date    LDLCALC 153.0 02/08/2022    Annually/Less than 100 mg/dl  No   Nephropathy screening No results found for: "LABMICR"  Lab Results   Component Value Date    PROTEINUA Negative 02/08/2022    Annually No   Blood pressure BP Readings from Last 1 Encounters:   12/11/24 130/78    Less than 140/90 No   Dilated retinal exam Completed today (11/13/2024). Referred to [facility] for further eval.  Annually Yes   Foot exam   UTD, completed by PCP. Annually Yes     REVIEW OF SYSTEMS  General: Denies " weakness, fatigue, or weight changes.   Eyes: Denies  eye pain, or redness. Positive slight blurred vision  Cardiovascular: Denies CP, palpitations, or edema.   Respiratory: Denies cough or dyspnea.   GI: Denies heartburn, n/v, or diarrhea constipation.  Skin: Denies rash, lesions, itching, or injection site problems.  Neuro: Denies severe HAs, numbness, tingling, tremors, or vertigo.   Endocrine: Denies polyuria, polydipsia, polyphagia, heat or cold intolerance.     Vital Signs  Vitals:    12/11/24 0853   BP: 130/78   Pulse: 74          Hemoglobin A1C   Date Value Ref Range Status   11/11/2024 8.4 (H) 4.0 - 5.6 % Final     Comment:     ADA Screening Guidelines:  5.7-6.4%  Consistent with prediabetes  >or=6.5%  Consistent with diabetes    High levels of fetal hemoglobin interfere with the HbA1C  assay. Heterozygous hemoglobin variants (HbS, HgC, etc)do  not significantly interfere with this assay.   However, presence of multiple variants may affect accuracy.     02/08/2022 12.2 (H) 4.0 - 5.6 % Final     Comment:     ADA Screening Guidelines:  5.7-6.4%  Consistent with prediabetes  >or=6.5%  Consistent with diabetes    High levels of fetal hemoglobin interfere with the HbA1C  assay. Heterozygous hemoglobin variants (HbS, HgC, etc)do  not significantly interfere with this assay.   However, presence of multiple variants may affect accuracy.          Chemistry        Component Value Date/Time     11/11/2024 0941    K 3.6 11/11/2024 0941     11/11/2024 0941    CO2 22 (L) 11/11/2024 0941    BUN 10 11/11/2024 0941    CREATININE 0.7 11/11/2024 0941     (H) 11/11/2024 0941        Component Value Date/Time    CALCIUM 9.1 11/11/2024 0941    ALKPHOS 91 11/11/2024 0941    AST 27 11/11/2024 0941    ALT 42 11/11/2024 0941    BILITOT 0.6 11/11/2024 0941    ESTGFRAFRICA >60.0 02/08/2022 1146    EGFRNONAA >60.0 02/08/2022 1146           Lab Results   Component Value Date    TSH 0.772 02/08/2022      Lab Results    Component Value Date    CHOL 225 (H) 02/08/2022     Lab Results   Component Value Date    HDL 45 02/08/2022     Lab Results   Component Value Date    LDLCALC 153.0 02/08/2022     Lab Results   Component Value Date    TRIG 135 02/08/2022     Lab Results   Component Value Date    CHOLHDL 20.0 02/08/2022         PHYSICAL EXAMINATION  Constitutional: Appears well, no distress.  Eyes: Sclera white, PERRLA, EOMs intact.  Neck: Supple, trachea midline.   Respiratory: No cough, SOB, nor AMARO.  Cardiovascular: RRR; no edema.  Skin: Warm and dry; no rash, lesions, acanthosis nigricans, nor injection site reactions.  Neuro: AAOx4, steady gait  Psychiatric: No unusual, disruptive, or inappropriate behavior.         Assessment/Plan  1. Type 2 diabetes mellitus with hyperglycemia, without long-term current use of insulin  empagliflozin (JARDIANCE) 10 mg tablet    -Discussed A1c goal <7%.  -Discussed lifestyle changes  -Cont. Metformin ER with dinner. Ok for a holiday and monitor BG while off therapy.  -Start Jardiance 10mg daily. Discussed MOA, SE, and dosage.  -Decrease Basaglar to 9 units at bedtime  after starting Jardiance.   -Discussed goal to wean insulin to a minimal dose w/o experiencing hypo or hyperglycemia.        2. Uses self-applied continuous glucose monitoring device  Dexcom G7 reviewed with patient.  -See readings attached to visit.  -Re-eval upon next visit.             FOLLOW UP  Follow up in about 4 weeks (around 1/8/2025) for Virtual Visit.

## 2024-12-11 NOTE — PATIENT INSTRUCTIONS
Continue monitoring your blood sugars using your Dexcom.    Start Jardiance 10mg every morning.     Decrease Basaglar to 9 units at bedtime  after starting Jardiance.     Ok to give yourself a trial off  Metfomin. If your blood sugars rise after stopping Metformin, resume Metformin with dinner.     Keep your upcoming eye appointment.     F/u in 1 month.

## 2024-12-12 DIAGNOSIS — E11.9 TYPE 2 DIABETES MELLITUS WITHOUT COMPLICATION: ICD-10-CM

## 2024-12-20 ENCOUNTER — CLINICAL SUPPORT (OUTPATIENT)
Dept: OPHTHALMOLOGY | Facility: CLINIC | Age: 59
End: 2024-12-20
Payer: COMMERCIAL

## 2024-12-20 ENCOUNTER — OFFICE VISIT (OUTPATIENT)
Dept: OPHTHALMOLOGY | Facility: CLINIC | Age: 59
End: 2024-12-20
Payer: COMMERCIAL

## 2024-12-20 DIAGNOSIS — E11.3593 PROLIFERATIVE DIABETIC RETINOPATHY OF BOTH EYES WITHOUT MACULAR EDEMA ASSOCIATED WITH TYPE 2 DIABETES MELLITUS: Primary | ICD-10-CM

## 2024-12-20 DIAGNOSIS — H25.13 AGE-RELATED NUCLEAR CATARACT OF BOTH EYES: ICD-10-CM

## 2024-12-20 DIAGNOSIS — H43.813 VITREOUS DEGENERATION OF BOTH EYES: ICD-10-CM

## 2024-12-20 DIAGNOSIS — H43.11 VITREOUS HEMORRHAGE, RIGHT: ICD-10-CM

## 2024-12-20 PROCEDURE — 99999 PR PBB SHADOW E&M-EST. PATIENT-LVL I: CPT | Mod: PBBFAC,,,

## 2024-12-20 PROCEDURE — 99999 PR PBB SHADOW E&M-EST. PATIENT-LVL III: CPT | Mod: PBBFAC,,, | Performed by: OPHTHALMOLOGY

## 2024-12-20 RX ORDER — FLUORESCEIN 500 MG/ML
5 INJECTION INTRAVENOUS
Status: SHIPPED | OUTPATIENT
Start: 2024-12-20

## 2024-12-20 NOTE — PROGRESS NOTES
HPI    Last eye exam was 12/6/24 with Dr. Pickett.    Patient here for vit hem OD evaluation. Patient states still seeing a   floater OD but not as often. No flashes of light.    Vitreous hemmorhage OD  Cataracts OU  Last edited by Julieta Oliver MA on 12/20/2024  9:02 AM.         A/P    ICD-10-CM ICD-9-CM   1. Proliferative diabetic retinopathy of both eyes without macular edema associated with type 2 diabetes mellitus  E11.3593 250.50     362.02   2. Vitreous hemorrhage, right  H43.11 379.23   3. Vitreous degeneration of both eyes  H43.813 379.21   4. Age-related nuclear cataract of both eyes  H25.13 366.16       1. Proliferative diabetic retinopathy of both eyes without macular edema associated with type 2 diabetes mellitus  2. Vitreous hemorrhage, right  PCP  Bebe Saldana MD - Recent notes reviewed   11/11/2024  8.4  A1C    ED f/u for new floaters - Recent notes reviewed     FA with leakage OU, capillary dropout    OD: no injections or laser  VA 20/25, new VH with active NV  Plan: given active NV, recommend PRP OD today    Based on todays exam, diagnostic studies, and review of records, the determination was made for treatment today.  Schedule Panretinal photocoagulation today Right Eye Patient chooses to proceed with laser R/B/A discussed patient elects to proceed    Patient identified.  Timeout performed.    Laser Procedure Note  Panretinal Photocoagulation laser Right Eye  Anesthesia: topical Proparacaine  Complications: none  Size: 200 microns  Duration: 80 ms  Power: 270  Number: [id number]  Good laser uptake, no complications  F/u as above, RTC sooner PRN     OS: no injections or laser  [facility] 20/25, active NV, no IRF  Plan: given active NV, will bring back for PRP OS    Recommend good blood pressure control, tight blood glucose control, and good cholesterol control      3. Vitreous degeneration of both eyes  Has PVD OD, no RT/RD  Plan: Observation  Pathology of PVD, Retinal Tear, Retinal Detachment reviewed  in great detail  RD precautions discussed in detail, patient expressed understanding  RTC immediately PRN (especially ANY change flashes, floaters, vision, visual field)     4. Age-related nuclear cataract of both eyes  Mild NS, NVS  Plan: Observation Update Mrx prn     RTC 1 mo DFE/OCTm OU, PRP OS      I saw and examined the patient and reviewed in detail the findings documented. The final examination findings, image interpretations which have been independently interpreted, and plan as documented in the record represent my personal judgment and conclusions.    Art Rider MD  Vitreoretinal Surgery   [facility]

## 2025-01-08 ENCOUNTER — OFFICE VISIT (OUTPATIENT)
Dept: INTERNAL MEDICINE | Facility: CLINIC | Age: 60
End: 2025-01-08
Payer: COMMERCIAL

## 2025-01-08 DIAGNOSIS — E11.65 TYPE 2 DIABETES MELLITUS WITH HYPERGLYCEMIA, WITHOUT LONG-TERM CURRENT USE OF INSULIN: Primary | ICD-10-CM

## 2025-01-08 DIAGNOSIS — Z97.8 USES SELF-APPLIED CONTINUOUS GLUCOSE MONITORING DEVICE: ICD-10-CM

## 2025-01-08 RX ORDER — PEN NEEDLE, DIABETIC 32 GX 1/4"
1 NEEDLE, DISPOSABLE MISCELLANEOUS NIGHTLY
Qty: 100 EACH | Refills: 3 | Status: SHIPPED | OUTPATIENT
Start: 2025-01-08

## 2025-01-08 NOTE — PATIENT INSTRUCTIONS
Continue monitoring your blood sugars using your Dexcom G7.    Continue Jardiance 10mg daily.    Continue Basaglar 9 units  at bedtime. Be consistent with taking your Basaglar every night.    Start limiting carbs, fats, and sugars in your diet.     See link listed below regarding meal planning.    https://diabetes.org/food-nutrition/diabetes-friendly-recipes      Start exercising for 30 minutes at least 3 times / week as tolerates. Be sure to mix cardio with strength training.     Increase water intake while taking Jardiance.    F/u in 1 month. Office will contact you to set up an appointment.

## 2025-01-08 NOTE — PROGRESS NOTES
The patient location is: Work    Visit type: audiovisual    Face to Face time with patient:   60 minutes of total time spent on the encounter, which includes face to face time and non-face to face time preparing to see the patient (eg, review of tests), Obtaining and/or reviewing separately obtained history, Documenting clinical information in the electronic or other health record, Independently interpreting results (not separately reported) and communicating results to the patient/family/caregiver, or Care coordination (not separately reported).         Each patient to whom he or she provides medical services by telemedicine is:  (1) informed of the relationship between the physician and patient and the respective role of any other health care provider with respect to management of the patient; and (2) notified that he or she may decline to receive medical services by telemedicine and may withdraw from such care at any time.      CHIEF COMPLAINT: Type 2 Diabetes    Referred by PCP: Dr. Saldana  Previously managed by: Dr. Saldana  CDE person of contact (if needed): Kami Morton     HPI: Mrs. Elva Rob is a 59 y.o. female who was diagnosed with Type 2 DM in 3-4 yrs ago.   Last A1c: 8.4% (11/2024)    Hx of hospitalization for DM? No   Hx of HTN? No   Hx of CKD? No   Hx of CHF? No  Hx of CVA? No   Hx of MI? No   Hx of Pancreatitis? No   Hx of DM neuropathy? No   Hx DR? No   Hx of Long term steroid use? No   No statin~ HX elevated liver enzymes.  Dx vitreous hemorrhage to right eye recently    Receives Dexcom supplies through [facility] [employer] [facility] [employer]       VISIT SUMMARY:  Reviewed Dexcom G7 report, BG av increased from 141 to 175. Reports holiday meals, lack of rest and lack of exercising are contributing to her elevated BG. Also reports contracted yeast infection after starting Jardiance (resolved). She treated it OTC meds and still taking Jardiance without any complications. Reports missed Basaglar couple of  "times. Would like to try lifestyle changes before making medication adjustments. Not taking statin due to elevated liver enzymes.       LIFESTYLE:   Diet: Not following usual diet- 3 meals daily with healthy snacks and  increased  water intake.   Exercise: Has slacked- usually walks often on the treadmill at home, in the neighborhood, and while in public.        Previous Diabetes Meds tried:  Metformin- GI upset with BID dose.    Current Diabetic Meds:   Jardiance 10mg daily; Basaglar 9u HS.      Diabetes Management Status:  Statin: Not taking  ACE/ARB: Not taking    Eye exam due:  12/20/2025  Foot exam due:  11/13/2025        Screening or Prevention Patient's value Goal Complete/Controlled?   HgA1C Testing and Control   Lab Results   Component Value Date    HGBA1C 8.4 (H) 11/11/2024      Annually/Less than 8% No   Lipid profile : 02/08/2022 Annually No   LDL control Lab Results   Component Value Date    LDLCALC 153.0 02/08/2022    Annually/Less than 100 mg/dl  No   Nephropathy screening No results found for: "LABMICR"  Lab Results   Component Value Date    PROTEINUA Negative 02/08/2022    Annually No   Blood pressure BP Readings from Last 1 Encounters:   12/11/24 130/78    Less than 140/90 No   Dilated retinal exam Due 12/20/2025 Annually Yes   Foot exam   Due  11/13/2025 Annually Yes     REVIEW OF SYSTEMS  General: Denies weakness, fatigue, or weight changes.   Eyes: Denies blurred vision,  eye pain, or redness.   Cardiovascular: Denies CP, palpitations, or edema.   Respiratory: Denies cough or dyspnea.   GI: Denies heartburn, n/v, or diarrhea constipation.  Skin: Denies rash, lesions, itching, or injection site problems.  Neuro: Denies severe HAs, numbness, tingling, tremors, or vertigo.   Endocrine: Denies polyuria, polydipsia, polyphagia, heat or cold intolerance.     Vital Signs  There were no vitals filed for this visit.         Hemoglobin A1C   Date Value Ref Range Status   11/11/2024 8.4 (H) 4.0 - 5.6 % " Final     Comment:     ADA Screening Guidelines:  5.7-6.4%  Consistent with prediabetes  >or=6.5%  Consistent with diabetes    High levels of fetal hemoglobin interfere with the HbA1C  assay. Heterozygous hemoglobin variants (HbS, HgC, etc)do  not significantly interfere with this assay.   However, presence of multiple variants may affect accuracy.     02/08/2022 12.2 (H) 4.0 - 5.6 % Final     Comment:     ADA Screening Guidelines:  5.7-6.4%  Consistent with prediabetes  >or=6.5%  Consistent with diabetes    High levels of fetal hemoglobin interfere with the HbA1C  assay. Heterozygous hemoglobin variants (HbS, HgC, etc)do  not significantly interfere with this assay.   However, presence of multiple variants may affect accuracy.          Chemistry        Component Value Date/Time     11/11/2024 0941    K 3.6 11/11/2024 0941     11/11/2024 0941    CO2 22 (L) 11/11/2024 0941    BUN 10 11/11/2024 0941    CREATININE 0.7 11/11/2024 0941     (H) 11/11/2024 0941        Component Value Date/Time    CALCIUM 9.1 11/11/2024 0941    ALKPHOS 91 11/11/2024 0941    AST 27 11/11/2024 0941    ALT 42 11/11/2024 0941    BILITOT 0.6 11/11/2024 0941    ESTGFRAFRICA >60.0 02/08/2022 1146    EGFRNONAA >60.0 02/08/2022 1146           Lab Results   Component Value Date    TSH 0.772 02/08/2022      Lab Results   Component Value Date    CHOL 225 (H) 02/08/2022     Lab Results   Component Value Date    HDL 45 02/08/2022     Lab Results   Component Value Date    LDLCALC 153.0 02/08/2022     Lab Results   Component Value Date    TRIG 135 02/08/2022     Lab Results   Component Value Date    CHOLHDL 20.0 02/08/2022         PHYSICAL EXAMINATION  Constitutional: Appears well, no distress.  Neuro: AAOx4  Psychiatric: No unusual, disruptive, or inappropriate behavior.         Assessment/Plan      1. Type 2 diabetes mellitus with hyperglycemia, without long-term current use of insulin  empagliflozin (JARDIANCE) 10 mg tablet    INSUPEN  "PEN NEEDLE 32 gauge x 5/32" Ndle  -Reviewed Last A1c: 8.4% (11/2024)  -Discussed lifestyle changes  -Advised to start limiting carbs, fats, and sugars in diet.  -Provided link to diabetes recipes.  -Advised to start an exercise regimen.  -Discussed importance of being consistent with taking Basaglar every night.  -Cont. Basaglar to 9 units at bedtime.  -Cont. Jardiance 10mg daily.  -Advised to add Metformin XR with dinner to current regimen for +  -Plan to increase Jardiance in the future.  -Plan to start stain therapy once liver enzymes returns to normal range.  -Cont. Dexcom G7.        2. Uses self-applied continuous glucose monitoring device  -Dexcom G7 reviewed with patient.  -See readings attached to visit.  -Re-eval upon next visit.                FOLLOW UP  Follow up in about 1 month (around 2/8/2025) for Virtual Visit.                           "

## 2025-01-28 ENCOUNTER — OFFICE VISIT (OUTPATIENT)
Dept: OBSTETRICS AND GYNECOLOGY | Facility: CLINIC | Age: 60
End: 2025-01-28
Payer: COMMERCIAL

## 2025-01-28 VITALS
DIASTOLIC BLOOD PRESSURE: 80 MMHG | HEIGHT: 63 IN | WEIGHT: 166.44 LBS | SYSTOLIC BLOOD PRESSURE: 124 MMHG | BODY MASS INDEX: 29.49 KG/M2

## 2025-01-28 DIAGNOSIS — Z12.11 SCREEN FOR COLON CANCER: ICD-10-CM

## 2025-01-28 DIAGNOSIS — B37.9 CANDIDIASIS: ICD-10-CM

## 2025-01-28 DIAGNOSIS — Z01.419 ENCOUNTER FOR WELL WOMAN EXAM WITH ROUTINE GYNECOLOGICAL EXAM: Primary | ICD-10-CM

## 2025-01-28 PROCEDURE — 3008F BODY MASS INDEX DOCD: CPT | Mod: CPTII,S$GLB,, | Performed by: OBSTETRICS & GYNECOLOGY

## 2025-01-28 PROCEDURE — 3079F DIAST BP 80-89 MM HG: CPT | Mod: CPTII,S$GLB,, | Performed by: OBSTETRICS & GYNECOLOGY

## 2025-01-28 PROCEDURE — 99999 PR PBB SHADOW E&M-EST. PATIENT-LVL III: CPT | Mod: PBBFAC,,, | Performed by: OBSTETRICS & GYNECOLOGY

## 2025-01-28 PROCEDURE — 1159F MED LIST DOCD IN RCRD: CPT | Mod: CPTII,S$GLB,, | Performed by: OBSTETRICS & GYNECOLOGY

## 2025-01-28 PROCEDURE — 3074F SYST BP LT 130 MM HG: CPT | Mod: CPTII,S$GLB,, | Performed by: OBSTETRICS & GYNECOLOGY

## 2025-01-28 PROCEDURE — 99396 PREV VISIT EST AGE 40-64: CPT | Mod: S$GLB,,, | Performed by: OBSTETRICS & GYNECOLOGY

## 2025-01-28 RX ORDER — FLUCONAZOLE 150 MG/1
150 TABLET ORAL
Qty: 10 TABLET | Refills: 3 | Status: SHIPPED | OUTPATIENT
Start: 2025-01-28 | End: 2025-01-31

## 2025-01-28 NOTE — PROGRESS NOTES
History & Physical  Gynecology      SUBJECTIVE:     Chief Complaint: Annual Exam       History of Present Illness:    Elva Rob is a 59 y.o. female G0 here for annual routine Pap and checkup. No LMP recorded. Patient is postmenopausal..  She has no unusual complaints.  Has been checking her BG and improving her BG levels.  Last Hgb A1C 7.1    Pt is not having any bleeding.  Pt is postmenopausal  denies break through bleeding.   denies vaginal itching or irritation.  denies vaginal discharge.    She is not sexually active at this time.    History of abnormal pap: No  Last Pap: 2022, normal. HPV negative  Last MMG: Yes - 2024  Last Colonoscopy:  No, will order today      Review of patient's allergies indicates:   Allergen Reactions    Sinus allergy Other (See Comments)       Past Medical History:   Diagnosis Date    Nephrolithiasis     Type 2 diabetes mellitus with hyperglycemia, without long-term current use of insulin 2024    Uses self-applied continuous glucose monitoring device 2024    Vitreous floaters of right eye      Past Surgical History:   Procedure Laterality Date    CHOLECYSTECTOMY  10/2024     OB History               Para        Term   0            AB        Living             SAB        IAB        Ectopic        Multiple        Live Births                   Family History   Problem Relation Name Age of Onset    Breast cancer Mother  55    Diabetes Mother      Hypertension Father      Gestational diabetes Sister      Prostate cancer Paternal Uncle      Thyroid disease Maternal Aunt       Social History     Tobacco Use    Smoking status: Never    Smokeless tobacco: Never   Substance Use Topics    Alcohol use: Not Currently     Comment: occasionaltl    Drug use: Never       Current Outpatient Medications   Medication Sig    blood sugar diagnostic Strp 1 strip by Misc.(Non-Drug; Combo Route) route 2 (two) times daily. Please provide items covered by patient's  "insurance    blood-glucose sensor (DEXCOM G7 SENSOR) Janel 1 Device by Misc.(Non-Drug; Combo Route) route every 10 days.    empagliflozin (JARDIANCE) 10 mg tablet Take 1 tablet (10 mg total) by mouth once daily.    insulin glargine U-100, Lantus, (BASAGLAR KWIKPEN U-100 INSULIN) 100 unit/mL (3 mL) InPn pen Inject 16 Units into the skin once daily. (Patient taking differently: Inject 9 Units into the skin every evening.)    INSUPEN PEN NEEDLE 32 gauge x 5/32" Ndle Inject 1 Needle into the skin every evening.    ONETOUCH DELICA PLUS LANCET 33 gauge [facility] 3 (three) times daily. Test Blood Sugar    ONETOUCH VERIO FLEX METER Misc use as directed    fluconazole (DIFLUCAN) 150 MG Tab Take 1 tablet (150 mg total) by mouth every 48 hours. for 2 doses     Current Facility-Administered Medications   Medication    fluorescein 500 mg/5 mL (10 %) injection 500 mg         Review of Systems:  Review of Systems   Constitutional:  Negative for activity change, appetite change, fatigue, fever and unexpected weight change.   Respiratory:  Negative for cough and shortness of breath.    Cardiovascular:  Negative for chest pain and leg swelling.   Gastrointestinal:  Negative for abdominal pain, blood in stool, constipation, diarrhea, nausea and vomiting.   Endocrine: Negative for diabetes, hair loss and hot flashes.   Genitourinary:  Negative for pelvic pain, postcoital bleeding and postmenopausal bleeding.   Musculoskeletal:  Negative for back pain.   Integumentary:  Negative for hair changes, breast mass, nipple discharge and breast skin changes.   Psychiatric/Behavioral:  Negative for sleep disturbance. The patient is not nervous/anxious.    Breast: Negative for mass, mastodynia, nipple discharge and skin changes       OBJECTIVE:     Physical Exam:  Physical Exam  Constitutional:       Appearance: She is well-developed.   HENT:      Head: Normocephalic and atraumatic.   Eyes:      General: No scleral icterus.        Right eye: No " discharge.         Left eye: No discharge.      Conjunctiva/sclera: Conjunctivae normal.   Pulmonary:      Effort: Pulmonary effort is normal.      Breath sounds: No stridor.   Chest:      Chest wall: No mass or tenderness.   Breasts:     Breasts are symmetrical.      Right: No inverted nipple, mass, nipple discharge, skin change or tenderness.      Left: No inverted nipple, mass, nipple discharge, skin change or tenderness.   Abdominal:      General: There is no distension.      Palpations: Abdomen is soft.      Tenderness: There is no abdominal tenderness.   Genitourinary:     Labia:         Right: No rash, tenderness, lesion or injury.         Left: No rash, tenderness, lesion or injury.       Vagina: Normal.      Cervix: No cervical motion tenderness, discharge or friability.      Adnexa:         Right: No mass, tenderness or fullness.          Left: No mass, tenderness or fullness.     Musculoskeletal:         General: Normal range of motion.   Skin:     General: Skin is warm and dry.   Neurological:      Mental Status: She is alert and oriented to person, place, and time.   Psychiatric:         Behavior: Behavior normal.         Thought Content: Thought content normal.         Judgment: Judgment normal.           ASSESSMENT:       ICD-10-CM ICD-9-CM    1. Encounter for well woman exam with routine gynecological exam  Z01.419 V72.31       2. Screen for colon cancer  Z12.11 V76.51 Ambulatory referral/consult to Endo Procedure       3. Candidiasis  B37.9 112.9 fluconazole (DIFLUCAN) 150 MG Tab               Plan:      Elva was seen today for annual exam.    Diagnoses and all orders for this visit:    Encounter for well woman exam with routine gynecological exam  - Cotesting up to date  - MMG up to date  - Cscope ordered    Encounter for screening colonoscopy  -     Case Request Endoscopy: COLONOSCOPY      Orders Placed This Encounter   Procedures    Ambulatory referral/consult to Endo Procedure         No follow-ups on file.         Sofiya Henson

## 2025-02-07 ENCOUNTER — OFFICE VISIT (OUTPATIENT)
Dept: INTERNAL MEDICINE | Facility: CLINIC | Age: 60
End: 2025-02-07
Payer: COMMERCIAL

## 2025-02-07 VITALS
RESPIRATION RATE: 18 BRPM | HEART RATE: 88 BPM | TEMPERATURE: 97 F | HEIGHT: 63 IN | DIASTOLIC BLOOD PRESSURE: 82 MMHG | BODY MASS INDEX: 29.72 KG/M2 | OXYGEN SATURATION: 98 % | WEIGHT: 167.75 LBS | SYSTOLIC BLOOD PRESSURE: 132 MMHG

## 2025-02-07 DIAGNOSIS — E11.69 HYPERLIPIDEMIA ASSOCIATED WITH TYPE 2 DIABETES MELLITUS: ICD-10-CM

## 2025-02-07 DIAGNOSIS — E78.5 HYPERLIPIDEMIA ASSOCIATED WITH TYPE 2 DIABETES MELLITUS: ICD-10-CM

## 2025-02-07 DIAGNOSIS — E11.9 TYPE 2 DIABETES MELLITUS WITHOUT COMPLICATION, WITHOUT LONG-TERM CURRENT USE OF INSULIN: ICD-10-CM

## 2025-02-07 DIAGNOSIS — Z00.00 ANNUAL PHYSICAL EXAM: Primary | ICD-10-CM

## 2025-02-07 DIAGNOSIS — E11.3593 PROLIFERATIVE DIABETIC RETINOPATHY OF BOTH EYES WITHOUT MACULAR EDEMA ASSOCIATED WITH TYPE 2 DIABETES MELLITUS: ICD-10-CM

## 2025-02-07 PROCEDURE — 3008F BODY MASS INDEX DOCD: CPT | Mod: CPTII,S$GLB,, | Performed by: INTERNAL MEDICINE

## 2025-02-07 PROCEDURE — 1159F MED LIST DOCD IN RCRD: CPT | Mod: CPTII,S$GLB,, | Performed by: INTERNAL MEDICINE

## 2025-02-07 PROCEDURE — 3079F DIAST BP 80-89 MM HG: CPT | Mod: CPTII,S$GLB,, | Performed by: INTERNAL MEDICINE

## 2025-02-07 PROCEDURE — 1160F RVW MEDS BY RX/DR IN RCRD: CPT | Mod: CPTII,S$GLB,, | Performed by: INTERNAL MEDICINE

## 2025-02-07 PROCEDURE — 3075F SYST BP GE 130 - 139MM HG: CPT | Mod: CPTII,S$GLB,, | Performed by: INTERNAL MEDICINE

## 2025-02-07 PROCEDURE — 3066F NEPHROPATHY DOC TX: CPT | Mod: CPTII,S$GLB,, | Performed by: INTERNAL MEDICINE

## 2025-02-07 PROCEDURE — 3061F NEG MICROALBUMINURIA REV: CPT | Mod: CPTII,S$GLB,, | Performed by: INTERNAL MEDICINE

## 2025-02-07 PROCEDURE — 99999 PR PBB SHADOW E&M-EST. PATIENT-LVL IV: CPT | Mod: PBBFAC,,, | Performed by: INTERNAL MEDICINE

## 2025-02-07 PROCEDURE — 99396 PREV VISIT EST AGE 40-64: CPT | Mod: S$GLB,,, | Performed by: INTERNAL MEDICINE

## 2025-02-07 PROCEDURE — 3044F HG A1C LEVEL LT 7.0%: CPT | Mod: CPTII,S$GLB,, | Performed by: INTERNAL MEDICINE

## 2025-02-07 NOTE — PROGRESS NOTES
"    Subjective:     PCP: Bebe Saldana MD    Elva Rob is a 59 y.o. female who presents for an annual exam.    Last appointment with me in 2022.     DM2-->>> started seeing Wellington Araujo for management, uses Dexcom 7    Medical History:   Past Medical History:   Diagnosis Date    Nephrolithiasis     Type 2 diabetes mellitus with hyperglycemia, without long-term current use of insulin 12/11/2024    Uses self-applied continuous glucose monitoring device 12/11/2024    Vitreous floaters of right eye        Family History: family history includes Breast cancer (age of onset: 55) in her mother; Diabetes in her mother; Gestational diabetes in her sister; Hypertension in her father; Prostate cancer in her paternal uncle; Thyroid disease in her maternal aunt.    Surgical History:   Past Surgical History:   Procedure Laterality Date    CHOLECYSTECTOMY  10/2024        Social History:  reports that she has never smoked. She has never used smokeless tobacco. She reports that she does not currently use alcohol. She reports that she does not use drugs.     Allergies:   Review of patient's allergies indicates:   Allergen Reactions    Sinus allergy Other (See Comments)       Medications:   Current Outpatient Medications   Medication Sig    blood sugar diagnostic Strp 1 strip by Misc.(Non-Drug; Combo Route) route 2 (two) times daily. Please provide items covered by patient's insurance    blood-glucose sensor (DEXCOM G7 SENSOR) Janel 1 Device by Misc.(Non-Drug; Combo Route) route every 10 days.    insulin glargine U-100, Lantus, (BASAGLAR KWIKPEN U-100 INSULIN) 100 unit/mL (3 mL) InPn pen Inject 16 Units into the skin once daily.    INSUPEN PEN NEEDLE 32 gauge x 5/32" Ndle Inject 1 Needle into the skin every evening.    empagliflozin (JARDIANCE) 25 mg tablet Take 1 tablet (25 mg total) by mouth once daily.    rosuvastatin (CRESTOR) 5 MG tablet Take 1 tablet (5 mg total) by mouth every evening.     Current Facility-Administered " Medications   Medication    fluorescein 500 mg/5 mL (10 %) injection 500 mg       Health Maintenance:   Health Maintenance Topics with due status: Not Due       Topic Last Completion Date    Cervical Cancer Screening 03/09/2022    Foot Exam 11/13/2024    Mammogram 11/13/2024    Diabetes Urine Screening 02/14/2025    Lipid Panel 02/14/2025    Hemoglobin A1c 02/14/2025    Low Dose Statin 03/21/2025    Diabetic Eye Exam 03/21/2025    RSV Vaccine (Age 60+ and Pregnant patients) Not Due     Lab Results   Component Value Date    HEPCAB Negative 02/08/2022     Eye Exam: Last Ophthalmology Visit: 3/21/2025 [facility] OPHTHALMOLOGY , reading glasses  Dental Exam: not recently  OB/GYN: Dr. Henson.   Pap smear: 3/15/2022  Mammogram: 11/2024   Colonoscopy:   due   Hepatitis C  Ab: 2/2022, neg      Vaccinations:  Immunization History   Administered Date(s) Administered    COVID-19, MRNA, LN-S, PF (Pfizer) (Gray Cap) 08/05/2022    COVID-19, MRNA, LN-S, PF (Pfizer) (Purple Cap) 03/05/2021, 03/26/2021, 10/05/2021    Hepatitis A, Adult 08/25/2024    Influenza - Trivalent - Fluarix, Flulaval, Fluzone, Afluria - PF 10/14/2024    Typhoid - ViCPs 08/26/2024    Zoster Recombinant 08/05/2022, 11/05/2022     Influenza: declined  Tetanus: ??  Shingrix: done  Prevnar-20: due  Covid vaccine: boosted x2  - received hepatitis a vaccine before trip to [facility]    Body mass index is 29.72 kg/m².  Wt Readings from Last 3 Encounters:   02/07/25 76.1 kg (167 lb 12.3 oz)   01/28/25 75.5 kg (166 lb 7.2 oz)   12/11/24 76.2 kg (167 lb 15.9 oz)       Review of Systems   Constitutional:  Negative for chills, diaphoresis, fatigue and fever.   HENT:  Negative for congestion, dental problem, ear discharge, ear pain, postnasal drip, rhinorrhea, sinus pressure, sore throat and trouble swallowing.    Eyes:  Positive for visual disturbance (floaters). Negative for redness.        She reports past hemorrhage in right eye   Respiratory:  Negative for cough, chest tightness  and shortness of breath.    Cardiovascular:  Negative for chest pain, palpitations and leg swelling.   Gastrointestinal:  Negative for abdominal pain, blood in stool, constipation, diarrhea, nausea and vomiting.   Endocrine: Negative for polydipsia and polyuria.   Genitourinary:  Positive for frequency (and had yeast infection after starting Jardiance). Negative for decreased urine volume, dysuria and hematuria.   Musculoskeletal:  Negative for arthralgias, back pain and myalgias.   Skin:  Negative for rash and wound.   Neurological:  Negative for dizziness, weakness, numbness and headaches.   Hematological:  Negative for adenopathy.   Psychiatric/Behavioral:  Negative for dysphoric mood and sleep disturbance. The patient is not nervous/anxious.           Objective:     Physical Exam  Vitals reviewed.   Constitutional:       General: She is awake. She is not in acute distress.     Appearance: Normal appearance. She is well-developed and well-groomed. She is not diaphoretic.   HENT:      Head: Normocephalic and atraumatic.      Right Ear: Hearing, tympanic membrane, ear canal and external ear normal. Tympanic membrane is not erythematous or bulging.      Left Ear: Hearing, tympanic membrane, ear canal and external ear normal. Tympanic membrane is not erythematous or bulging.      Nose: Nose normal. No congestion.      Mouth/Throat:      Mouth: Mucous membranes are moist.      Tongue: No lesions.      Pharynx: Oropharynx is clear. Uvula midline. No oropharyngeal exudate or posterior oropharyngeal erythema.   Eyes:      General: Lids are normal. Vision grossly intact. Gaze aligned appropriately. No scleral icterus.     Conjunctiva/sclera:      Right eye: Right conjunctiva is not injected.      Left eye: Left conjunctiva is not injected.      Pupils: Pupils are equal, round, and reactive to light.   Neck:      Thyroid: No thyroid mass or thyromegaly.   Cardiovascular:      Rate and Rhythm: Normal rate and regular  rhythm.      Pulses: Normal pulses.      Heart sounds: Normal heart sounds. No murmur heard.  Pulmonary:      Effort: Pulmonary effort is normal. No respiratory distress.      Breath sounds: Normal breath sounds. No decreased breath sounds or wheezing.   Abdominal:      General: Bowel sounds are normal. There is no distension.      Palpations: Abdomen is soft. Abdomen is not rigid.      Tenderness: There is no abdominal tenderness. There is no guarding or rebound.   Musculoskeletal:         General: Normal range of motion.      Cervical back: Normal range of motion and neck supple.      Right lower leg: No edema.      Left lower leg: No edema.   Lymphadenopathy:      Cervical: No cervical adenopathy.      Upper Body:      Right upper body: No supraclavicular adenopathy.      Left upper body: No supraclavicular adenopathy.   Skin:     General: Skin is warm and dry.      Coloration: Skin is not cyanotic.      Findings: No lesion or rash.      Nails: There is no clubbing.   Neurological:      General: No focal deficit present.      Mental Status: She is alert and oriented to person, place, and time.      Sensory: Sensation is intact.      Coordination: Coordination is intact.      Gait: Gait is intact.      Deep Tendon Reflexes: Reflexes are normal and symmetric.   Psychiatric:         Attention and Perception: Attention normal.         Mood and Affect: Mood normal.         Behavior: Behavior is cooperative.              Assessment:        1. Annual physical exam    2. Type 2 diabetes mellitus without complication, without long-term current use of insulin    3. Hyperlipidemia associated with type 2 diabetes mellitus    4. Proliferative diabetic retinopathy of both eyes without macular edema associated with type 2 diabetes mellitus           Plan:     1. Annual physical exam  - CBC Auto Differential; Future  - Comprehensive Metabolic Panel; Future  - Lipid Panel; Future  - TSH; Future  - Urinalysis; Future  - Hemoglobin  A1C; Future  - Microalbumin/Creatinine Ratio, Urine; Future    2. Type 2 diabetes mellitus without complication, without long-term current use of insulin  - controlled, continue current regimen with f/u from Endocrinology  - Hemoglobin A1C; Future  - Microalbumin/Creatinine Ratio, Urine; Future    3. Hyperlipidemia associated with type 2 diabetes mellitus  - on Crestor, no side effects, check labs  - Lipid Panel; Future    4. Proliferative diabetic retinopathy of both eyes without macular edema associated with type 2 diabetes mellitus  - return to Ophthalmology for follow-up    RTC in 6 months for follow-up or sooner if needed    __________________________    Bebe Saldana MD, PharmD  [facility]- Internal Medicine  American Board of Obesity Medicine diplomate  Office [phone]

## 2025-02-10 ENCOUNTER — OFFICE VISIT (OUTPATIENT)
Dept: INTERNAL MEDICINE | Facility: CLINIC | Age: 60
End: 2025-02-10
Payer: COMMERCIAL

## 2025-02-10 DIAGNOSIS — Z97.8 USES SELF-APPLIED CONTINUOUS GLUCOSE MONITORING DEVICE: ICD-10-CM

## 2025-02-10 DIAGNOSIS — E11.65 TYPE 2 DIABETES MELLITUS WITH HYPERGLYCEMIA, WITHOUT LONG-TERM CURRENT USE OF INSULIN: Primary | ICD-10-CM

## 2025-02-10 NOTE — PROGRESS NOTES
The patient location is: Work    Visit type: audiovisual    Face to Face time with patient: 18 minutes  35 minutes of total time spent on the encounter, which includes face to face time and non-face to face time preparing to see the patient (eg, review of tests), Obtaining and/or reviewing separately obtained history, Documenting clinical information in the electronic or other health record, Independently interpreting results (not separately reported) and communicating results to the patient/family/caregiver, or Care coordination (not separately reported).         Each patient to whom he or she provides medical services by telemedicine is:  (1) informed of the relationship between the physician and patient and the respective role of any other health care provider with respect to management of the patient; and (2) notified that he or she may decline to receive medical services by telemedicine and may withdraw from such care at any time.      CHIEF COMPLAINT: Type 2 Diabetes    Referred by PCP: Dr. Saldana  Previously managed by: Dr. Saldana  [facility] person of contact (if needed): Kami Morton     HPI: Mrs. Elva Rob is a 59 y.o. female who was diagnosed with Type 2 DM in 3-4 yrs ago.   Last A1c: 8.4% (11/2024)    Hx of hospitalization for DM? No   Hx of HTN? No   Hx of CKD? No   Hx of CHF? No  Hx of CVA? No   Hx of MI? No   Hx of Pancreatitis? No   Hx of DM neuropathy? No   Hx DR? No   Hx of Long term steroid use? No   No statin~ HX elevated liver enzymes.  Dx vitreous hemorrhage to right eye recently    Receives Dexcom supplies through [facility]       VISIT SUMMARY:  Reviewed Dexcom G7 report, av glucose decreased from 175 to 165   Discussed starting GLP-1 for PPG excursions, patient declined.   Discussed increasing Jardiance to 25mg daily and decreasing basaglar  Scheduled to have labs collected for PCP on 2/14/2025.  Discussed starting statin therapy if LFTs are normal.     LIFESTYLE:   Diet:2-3  "meals daily.   Exercise: walks 2 miles while at work      Previous Diabetes Meds tried:  Metformin- GI upset with BID dose.    Current Diabetic Meds:   Jardiance 10mg daily   Basaglar 9u HS.      Diabetes Management Status:  Statin: Not taking  ACE/ARB: Not taking    Eye exam due:  12/20/2025  Foot exam due:  11/13/2025        Screening or Prevention Patient's value Goal Complete/Controlled?   HgA1C Testing and Control   Lab Results   Component Value Date    HGBA1C 8.4 (H) 11/11/2024      Annually/Less than 8% No   Lipid profile Most Recent Lipid Panel Health Maintenance Topic Completion: Not Found Annually No   LDL control Lab Results   Component Value Date    LDLCALC 153.0 02/08/2022    Annually/Less than 100 mg/dl  No   Nephropathy screening No results found for: "LABMICR"  Lab Results   Component Value Date    PROTEINUA Negative 02/08/2022    Annually No   Blood pressure BP Readings from Last 1 Encounters:   02/07/25 132/82    Less than 140/90 No   Dilated retinal exam Due 12/20/2025 Annually Yes   Foot exam   Due  11/13/2025 Annually Yes     REVIEW OF SYSTEMS  General: Denies weakness, fatigue, or weight changes.   Eyes: Denies blurred vision,  eye pain, or redness.   Cardiovascular: Denies CP, palpitations, or edema.   Respiratory: Denies cough or dyspnea.   GI: Denies heartburn, n/v, or diarrhea constipation.  Skin: Denies rash, lesions, itching, or injection site problems.  Neuro: Denies severe HAs, numbness, tingling, tremors, or vertigo.   Endocrine: Denies polyuria, polydipsia, polyphagia, heat or cold intolerance.     Vital Signs  There were no vitals filed for this visit.         Hemoglobin A1C   Date Value Ref Range Status   11/11/2024 8.4 (H) 4.0 - 5.6 % Final     Comment:     ADA Screening Guidelines:  5.7-6.4%  Consistent with prediabetes  >or=6.5%  Consistent with diabetes    High levels of fetal hemoglobin interfere with the HbA1C  assay. Heterozygous hemoglobin variants (HbS, HgC, etc)do  not " significantly interfere with this assay.   However, presence of multiple variants may affect accuracy.     02/08/2022 12.2 (H) 4.0 - 5.6 % Final     Comment:     ADA Screening Guidelines:  5.7-6.4%  Consistent with prediabetes  >or=6.5%  Consistent with diabetes    High levels of fetal hemoglobin interfere with the HbA1C  assay. Heterozygous hemoglobin variants (HbS, HgC, etc)do  not significantly interfere with this assay.   However, presence of multiple variants may affect accuracy.          Chemistry        Component Value Date/Time     11/11/2024 0941    K 3.6 11/11/2024 0941     11/11/2024 0941    CO2 22 (L) 11/11/2024 0941    BUN 10 11/11/2024 0941    CREATININE 0.7 11/11/2024 0941     (H) 11/11/2024 0941        Component Value Date/Time    CALCIUM 9.1 11/11/2024 0941    ALKPHOS 91 11/11/2024 0941    AST 27 11/11/2024 0941    ALT 42 11/11/2024 0941    BILITOT 0.6 11/11/2024 0941    ESTGFRAFRICA >60.0 02/08/2022 1146    EGFRNONAA >60.0 02/08/2022 1146           Lab Results   Component Value Date    TSH 0.772 02/08/2022      Lab Results   Component Value Date    CHOL 225 (H) 02/08/2022     Lab Results   Component Value Date    HDL 45 02/08/2022     Lab Results   Component Value Date    LDLCALC 153.0 02/08/2022     Lab Results   Component Value Date    TRIG 135 02/08/2022     Lab Results   Component Value Date    CHOLHDL 20.0 02/08/2022         PHYSICAL EXAMINATION  Constitutional: Appears well, no distress.  Neuro: AAOx4  Psychiatric: No unusual, disruptive, or inappropriate behavior.         Assessment/Plan    1. Type 2 diabetes mellitus with hyperglycemia, without long-term current use of insulin  -     empagliflozin (JARDIANCE) 25 mg tablet; Take 1 tablet (25 mg total) by mouth once daily.  Dispense: 30 tablet; Refill: 3  -Reviewed Last A1c: 8.4% (11/2024)  -Update labs on 2/14/2025  -Cont. Dexcom G7.  -Declined GLP-1 for postprandial excursions.   -Increase Jardiance to 25mg  daily.  -Decrease Basaglar to 7u HS after increasing Jardiance.  -Advised to add Metformin XR with dinner to current regimen for +  -Plan to start stain therapy once liver enzymes returns to normal range.      2. Uses self-applied continuous glucose monitoring device  -Dexcom G7 readings reviewed with patient.  -Interpretation:   -Av glucose improved. FBG controlled with basal insulin. PPG remains above target range. Medication regimen adjusted according to current findings.   -See glucose readings below.  -Re-eval upon next visit.            FOLLOW UP  Follow up in about 5 weeks (around 3/19/2025) for Virtual Visit.

## 2025-02-10 NOTE — PROGRESS NOTES
HPI    1mo DFE OCT PRP OS  S/P PRP OD 12/20/24    Pt sts VA blurry up close but using glasses to assist with computer  No new f/f   No pain or discomfort  Last edited by Julieta Gonzalez on 2/11/2025  9:50 AM.          A/P    ICD-10-CM ICD-9-CM   1. Proliferative diabetic retinopathy of both eyes without macular edema associated with type 2 diabetes mellitus  E11.3593 250.50     362.02   2. Vitreous hemorrhage, right  H43.11 379.23   3. Vitreous degeneration of both eyes  H43.813 379.21   4. Age-related nuclear cataract of both eyes  H25.13 366.16           1. Proliferative diabetic retinopathy of both eyes without macular edema associated with type 2 diabetes mellitus  2. Vitreous hemorrhage, right  PCP  Bebe Saldana MD - Recent notes reviewed   11/11/2024  8.4  A1C    FA with leakage OU, capillary dropout    OD: s/p PRP 12/20/24  VA 20/40 (was 20/25), doing well since PRP, VH much better and NV regressing  Plan: Observation for now    OS: no injections or laser  VA 20/40 (was 20/25), active NV, no IRF  Plan: given active NV, recommend PRP OS    Based on todays exam, diagnostic studies, and review of records, the determination was made for treatment today.  Schedule Panretinal photocoagulation today Left Eye Patient chooses to proceed with laser R/B/A discussed patient elects to proceed    Patient identified.  Timeout performed.    Laser Procedure Note  Panretinal Photocoagulation laser Left Eye  Anesthesia: topical Proparacaine  Complications: none  Size: 200 microns  Duration: 80 ms  Power: 260  Number: [phone]  Good laser uptake, no complications  F/u as above, RTC sooner PRN       Recommend good blood pressure control, tight blood glucose control, and good cholesterol control      3. Vitreous degeneration of both eyes  Has PVD OD, no RT/RD  Plan: Observation  Pathology of PVD, Retinal Tear, Retinal Detachment reviewed in great detail  RD precautions discussed in detail, patient expressed understanding  RTC  immediately PRN (especially ANY change flashes, floaters, vision, visual field)     4. Age-related nuclear cataract of both eyes  Mild NS, NVS  Plan: Update Mrx     RTC 1 mo DFE/OCTm OU, poss Avastin OU      I saw and examined the patient and reviewed in detail the findings documented. The final examination findings, image interpretations which have been independently interpreted, and plan as documented in the record represent my personal judgment and conclusions.    Art Rider MD  Vitreoretinal Surgery   [facility]

## 2025-02-10 NOTE — PATIENT INSTRUCTIONS
Continue monitoring your blood sugars using your Dexcom.    Blood glucose goals:   Fasting blood glucose goal:  mg/dL.  Premeal blood glucose goal:  mg/dL.  Postmeal blood glucose goal ( 2 hrs): <180 mg/dL.  Bedtime / overnight blood glucose goal:  mg/dL.     Increase Jardiance to 25mg daily. You may double your 10mg tablets until supply runs out.     Start drinking at least 1 bottle of sugar free powerade or gatorade daily to prevent dehydration while taking Jardiance.     Decrease Basaglar to 7 units at bedtime. If your numbers remains elevated after increasing Jardiance, increase Basaglar back to 9 units at bedtime.    F/u in 5 weeks on 3/19/2025

## 2025-02-11 ENCOUNTER — OFFICE VISIT (OUTPATIENT)
Dept: OPHTHALMOLOGY | Facility: CLINIC | Age: 60
End: 2025-02-11
Payer: COMMERCIAL

## 2025-02-11 ENCOUNTER — CLINICAL SUPPORT (OUTPATIENT)
Dept: OPHTHALMOLOGY | Facility: CLINIC | Age: 60
End: 2025-02-11
Payer: COMMERCIAL

## 2025-02-11 DIAGNOSIS — H43.813 VITREOUS DEGENERATION OF BOTH EYES: ICD-10-CM

## 2025-02-11 DIAGNOSIS — H25.13 AGE-RELATED NUCLEAR CATARACT OF BOTH EYES: ICD-10-CM

## 2025-02-11 DIAGNOSIS — E11.3593 PROLIFERATIVE DIABETIC RETINOPATHY OF BOTH EYES WITHOUT MACULAR EDEMA ASSOCIATED WITH TYPE 2 DIABETES MELLITUS: Primary | ICD-10-CM

## 2025-02-11 DIAGNOSIS — H43.11 VITREOUS HEMORRHAGE, RIGHT: ICD-10-CM

## 2025-02-11 PROCEDURE — 99999 PR PBB SHADOW E&M-EST. PATIENT-LVL II: CPT | Mod: PBBFAC,,, | Performed by: OPHTHALMOLOGY

## 2025-02-11 PROCEDURE — 99214 OFFICE O/P EST MOD 30 MIN: CPT | Mod: 25,S$GLB,, | Performed by: OPHTHALMOLOGY

## 2025-02-11 PROCEDURE — 92134 CPTRZ OPH DX IMG PST SGM RTA: CPT | Mod: S$GLB,,, | Performed by: OPHTHALMOLOGY

## 2025-02-11 PROCEDURE — 67228 TREATMENT X10SV RETINOPATHY: CPT | Mod: LT,S$GLB,, | Performed by: OPHTHALMOLOGY

## 2025-02-11 PROCEDURE — 1159F MED LIST DOCD IN RCRD: CPT | Mod: CPTII,S$GLB,, | Performed by: OPHTHALMOLOGY

## 2025-02-11 PROCEDURE — 1160F RVW MEDS BY RX/DR IN RCRD: CPT | Mod: CPTII,S$GLB,, | Performed by: OPHTHALMOLOGY

## 2025-02-11 PROCEDURE — 99999 PR PBB SHADOW E&M-EST. PATIENT-LVL I: CPT | Mod: PBBFAC,,,

## 2025-02-14 ENCOUNTER — LAB VISIT (OUTPATIENT)
Dept: LAB | Facility: HOSPITAL | Age: 60
End: 2025-02-14
Attending: INTERNAL MEDICINE
Payer: COMMERCIAL

## 2025-02-14 DIAGNOSIS — Z00.00 ANNUAL PHYSICAL EXAM: ICD-10-CM

## 2025-02-14 LAB
ALBUMIN SERPL BCP-MCNC: 3.8 G/DL (ref 3.5–5.2)
ALP SERPL-CCNC: 93 U/L (ref 40–150)
ALT SERPL W/O P-5'-P-CCNC: 27 U/L (ref 10–44)
ANION GAP SERPL CALC-SCNC: 11 MMOL/L (ref 8–16)
AST SERPL-CCNC: 46 U/L (ref 10–40)
BASOPHILS # BLD AUTO: 0.02 K/UL (ref 0–0.2)
BASOPHILS NFR BLD: 0.5 % (ref 0–1.9)
BILIRUB SERPL-MCNC: 0.6 MG/DL (ref 0.1–1)
BUN SERPL-MCNC: 12 MG/DL (ref 6–20)
CALCIUM SERPL-MCNC: 9.4 MG/DL (ref 8.7–10.5)
CHLORIDE SERPL-SCNC: 106 MMOL/L (ref 95–110)
CHOLEST SERPL-MCNC: 202 MG/DL (ref 120–199)
CHOLEST/HDLC SERPL: 4.8 {RATIO} (ref 2–5)
CO2 SERPL-SCNC: 23 MMOL/L (ref 23–29)
CREAT SERPL-MCNC: 0.6 MG/DL (ref 0.5–1.4)
DIFFERENTIAL METHOD BLD: ABNORMAL
EOSINOPHIL # BLD AUTO: 0.1 K/UL (ref 0–0.5)
EOSINOPHIL NFR BLD: 1.9 % (ref 0–8)
ERYTHROCYTE [DISTWIDTH] IN BLOOD BY AUTOMATED COUNT: 13.6 % (ref 11.5–14.5)
EST. GFR  (NO RACE VARIABLE): >60 ML/MIN/1.73 M^2
ESTIMATED AVG GLUCOSE: 148 MG/DL (ref 68–131)
GLUCOSE SERPL-MCNC: 95 MG/DL (ref 70–110)
HBA1C MFR BLD: 6.8 % (ref 4–5.6)
HCT VFR BLD AUTO: 41.6 % (ref 37–48.5)
HDLC SERPL-MCNC: 42 MG/DL (ref 40–75)
HDLC SERPL: 20.8 % (ref 20–50)
HGB BLD-MCNC: 13 G/DL (ref 12–16)
IMM GRANULOCYTES # BLD AUTO: 0.01 K/UL (ref 0–0.04)
IMM GRANULOCYTES NFR BLD AUTO: 0.2 % (ref 0–0.5)
LDLC SERPL CALC-MCNC: 138.8 MG/DL (ref 63–159)
LYMPHOCYTES # BLD AUTO: 2.3 K/UL (ref 1–4.8)
LYMPHOCYTES NFR BLD: 52.1 % (ref 18–48)
MCH RBC QN AUTO: 28.5 PG (ref 27–31)
MCHC RBC AUTO-ENTMCNC: 31.3 G/DL (ref 32–36)
MCV RBC AUTO: 91 FL (ref 82–98)
MONOCYTES # BLD AUTO: 0.4 K/UL (ref 0.3–1)
MONOCYTES NFR BLD: 8.6 % (ref 4–15)
NEUTROPHILS # BLD AUTO: 1.6 K/UL (ref 1.8–7.7)
NEUTROPHILS NFR BLD: 36.7 % (ref 38–73)
NONHDLC SERPL-MCNC: 160 MG/DL
NRBC BLD-RTO: 0 /100 WBC
PLATELET # BLD AUTO: 284 K/UL (ref 150–450)
PMV BLD AUTO: 10.2 FL (ref 9.2–12.9)
POTASSIUM SERPL-SCNC: 3.8 MMOL/L (ref 3.5–5.1)
PROT SERPL-MCNC: 7.7 G/DL (ref 6–8.4)
RBC # BLD AUTO: 4.56 M/UL (ref 4–5.4)
SODIUM SERPL-SCNC: 140 MMOL/L (ref 136–145)
TRIGL SERPL-MCNC: 106 MG/DL (ref 30–150)
TSH SERPL DL<=0.005 MIU/L-ACNC: 1.94 UIU/ML (ref 0.4–4)
WBC # BLD AUTO: 4.32 K/UL (ref 3.9–12.7)

## 2025-02-14 PROCEDURE — 84443 ASSAY THYROID STIM HORMONE: CPT | Performed by: INTERNAL MEDICINE

## 2025-02-14 PROCEDURE — 85025 COMPLETE CBC W/AUTO DIFF WBC: CPT | Performed by: INTERNAL MEDICINE

## 2025-02-14 PROCEDURE — 80061 LIPID PANEL: CPT | Performed by: INTERNAL MEDICINE

## 2025-02-14 PROCEDURE — 83036 HEMOGLOBIN GLYCOSYLATED A1C: CPT | Performed by: INTERNAL MEDICINE

## 2025-02-14 PROCEDURE — 80053 COMPREHEN METABOLIC PANEL: CPT | Performed by: INTERNAL MEDICINE

## 2025-03-19 ENCOUNTER — OFFICE VISIT (OUTPATIENT)
Dept: INTERNAL MEDICINE | Facility: CLINIC | Age: 60
End: 2025-03-19
Payer: COMMERCIAL

## 2025-03-19 DIAGNOSIS — E11.65 TYPE 2 DIABETES MELLITUS WITH HYPERGLYCEMIA, WITHOUT LONG-TERM CURRENT USE OF INSULIN: Primary | ICD-10-CM

## 2025-03-19 DIAGNOSIS — E11.69 HYPERLIPIDEMIA ASSOCIATED WITH TYPE 2 DIABETES MELLITUS: ICD-10-CM

## 2025-03-19 DIAGNOSIS — E78.5 HYPERLIPIDEMIA ASSOCIATED WITH TYPE 2 DIABETES MELLITUS: ICD-10-CM

## 2025-03-19 DIAGNOSIS — Z97.8 USES SELF-APPLIED CONTINUOUS GLUCOSE MONITORING DEVICE: ICD-10-CM

## 2025-03-19 RX ORDER — ROSUVASTATIN CALCIUM 5 MG/1
5 TABLET, COATED ORAL NIGHTLY
Qty: 30 TABLET | Refills: 3 | Status: SHIPPED | OUTPATIENT
Start: 2025-03-19 | End: 2026-03-19

## 2025-03-19 NOTE — PROGRESS NOTES
The patient location is: Work    Visit type: audiovisual    Face to Face time with patient: 18 minutes  33 minutes of total time spent on the encounter, which includes face to face time and non-face to face time preparing to see the patient (eg, review of tests), Obtaining and/or reviewing separately obtained history, Documenting clinical information in the electronic or other health record, Independently interpreting results (not separately reported) and communicating results to the patient/family/caregiver, or Care coordination (not separately reported).         Each patient to whom he or she provides medical services by telemedicine is:  (1) informed of the relationship between the physician and patient and the respective role of any other health care provider with respect to management of the patient; and (2) notified that he or she may decline to receive medical services by telemedicine and may withdraw from such care at any time.      CHIEF COMPLAINT: Type 2 Diabetes    Referred by PCP: Dr. Saldana  Previously managed by: Dr. Saldana  E person of contact (if needed): Kami Morton     HPI: Mrs. Elva Rob is a 59 y.o. female who was diagnosed with Type 2 DM in 3-4 yrs ago.   Last A1c: 6.4% (2/2025)    Hx of hospitalization for DM? No   Hx of HTN? No   Hx of CKD? No   Hx of CHF? No  Hx of CVA? No   Hx of MI? No   Hx of Pancreatitis? No   Hx of DM neuropathy? No   Hx DR? No   Hx of Long term steroid use? No   No statin~ HX elevated liver enzymes.  Dx vitreous hemorrhage to right eye recently    [place] [facility] [place]       VISIT SUMMARY:  Reviewed Dexcom G7 report, av glucose decreased from 165 to 152  Reports no longer taking Metformin  Reports tolerating Jardiance well  Discussed tapering basal insulin  Labs reviewed: A1c decreased from 8.4% to 6.8%; Lipid panel 202/106/42/138  Currently not on statin. LFTs in range, Discussed starting rosuvastatin 5mg HS.  Provided  handout on low cholesterol diet  Hold off starting GLP-1 at this time.       LIFESTYLE:   Diet:2-3 meals daily.   Exercise: walks 2 miles while at work      Previous Diabetes Meds tried:  Metformin- GI upset with BID dose.      Current Diabetic Meds:   Jardiance 25mg daily  Basaglar 7u HS.      Diabetes Management Status:  Statin: Not taking  ACE/ARB: Not taking    Eye exam due:  12/20/2025  Foot exam due:  11/13/2025        Screening or Prevention Patient's value Goal Complete/Controlled?   HgA1C Testing and Control   Lab Results   Component Value Date    HGBA1C 6.8 (H) 02/14/2025      Annually/Less than 8% No   Lipid profile : 02/14/2025 Annually No   LDL control Lab Results   Component Value Date    LDLCALC 138.8 02/14/2025    Annually/Less than 100 mg/dl  No   Nephropathy screening Lab Results   Component Value Date    LABMICR 5.0 02/14/2025     Lab Results   Component Value Date    PROTEINUA Negative 02/14/2025    Annually No   Blood pressure BP Readings from Last 1 Encounters:   02/07/25 132/82    Less than 140/90 No   Dilated retinal exam Due 12/20/2025 Annually Yes   Foot exam   Due  11/13/2025 Annually Yes     REVIEW OF SYSTEMS  General: Denies weakness, fatigue, or weight changes.   Eyes: Denies blurred vision,  eye pain, or redness.   Cardiovascular: Denies CP, palpitations, or edema.   Respiratory: Denies cough or dyspnea.   GI: Denies heartburn, n/v, or diarrhea constipation.  Skin: Denies rash, lesions, itching, or injection site problems.  Neuro: Denies severe HAs, numbness, tingling, tremors, or vertigo.   Endocrine: Denies polyuria, polydipsia, polyphagia, heat or cold intolerance.     Vital Signs  There were no vitals filed for this visit.         Hemoglobin A1C   Date Value Ref Range Status   02/14/2025 6.8 (H) 4.0 - 5.6 % Final     Comment:     ADA Screening Guidelines:  5.7-6.4%  Consistent with prediabetes  >or=6.5%  Consistent with diabetes    High levels of fetal hemoglobin interfere with the  HbA1C  assay. Heterozygous hemoglobin variants (HbS, HgC, etc)do  not significantly interfere with this assay.   However, presence of multiple variants may affect accuracy.     11/11/2024 8.4 (H) 4.0 - 5.6 % Final     Comment:     ADA Screening Guidelines:  5.7-6.4%  Consistent with prediabetes  >or=6.5%  Consistent with diabetes    High levels of fetal hemoglobin interfere with the HbA1C  assay. Heterozygous hemoglobin variants (HbS, HgC, etc)do  not significantly interfere with this assay.   However, presence of multiple variants may affect accuracy.     02/08/2022 12.2 (H) 4.0 - 5.6 % Final     Comment:     ADA Screening Guidelines:  5.7-6.4%  Consistent with prediabetes  >or=6.5%  Consistent with diabetes    High levels of fetal hemoglobin interfere with the HbA1C  assay. Heterozygous hemoglobin variants (HbS, HgC, etc)do  not significantly interfere with this assay.   However, presence of multiple variants may affect accuracy.          Chemistry        Component Value Date/Time     02/14/2025 0659    K 3.8 02/14/2025 0659     02/14/2025 0659    CO2 23 02/14/2025 0659    BUN 12 02/14/2025 0659    CREATININE 0.6 02/14/2025 0659    GLU 95 02/14/2025 0659        Component Value Date/Time    CALCIUM 9.4 02/14/2025 0659    ALKPHOS 93 02/14/2025 0659    AST 46 (H) 02/14/2025 0659    ALT 27 02/14/2025 0659    BILITOT 0.6 02/14/2025 0659    ESTGFRAFRICA >60.0 02/08/2022 1146    EGFRNONAA >60.0 02/08/2022 1146           Lab Results   Component Value Date    TSH 1.944 02/14/2025      Lab Results   Component Value Date    CHOL 202 (H) 02/14/2025    CHOL 225 (H) 02/08/2022     Lab Results   Component Value Date    HDL 42 02/14/2025    HDL 45 02/08/2022     Lab Results   Component Value Date    LDLCALC 138.8 02/14/2025    LDLCALC 153.0 02/08/2022     Lab Results   Component Value Date    TRIG 106 02/14/2025    TRIG 135 02/08/2022     Lab Results   Component Value Date    CHOLHDL 20.8 02/14/2025    CHOLHDL 20.0  02/08/2022         PHYSICAL EXAMINATION  Constitutional: Appears well, no distress.  Neuro: AAOx4  Psychiatric: No unusual, disruptive, or inappropriate behavior.         Assessment/Plan    1. Type 2 diabetes mellitus with hyperglycemia, without long-term current use of insulin  -     empagliflozin (JARDIANCE) 25 mg tablet; Take 1 tablet (25 mg total) by mouth once daily.  Dispense: 30 tablet; Refill: 3      1. Type 2 diabetes mellitus with hyperglycemia, without long-term current use of insulin    -Reviewed Last A1c: 6.8% (2/2025)  -Discussed blood glucose goals. Provided copy  -Cont. Dexcom G7.  -No need for GLP-1 at this time.   -Continue Jardiance to 25mg daily.  -Decrease Basaglar to 5u HS. Ok to give self a trial off insulin. Parameters provided when to resume Basaglar.    -Cont to hold Metformin  -Advised to increase exercise regimen.  -Advised to refer to food brochures when planning meals.        2. Hyperlipidemia associated with type 2 diabetes mellitus  -     rosuvastatin (CRESTOR) 5 MG tablet; Take 1 tablet (5 mg total) by mouth every evening.  Dispense: 30 tablet; Refill: 3  -Reviewed Lipid panel 202/106/42/138  -Discussed LDL goal <70.  -Discussed lifestyle changes.  -Provided handout on low cholesterol diet  -Start Rosuvastatin 5mg HS.  -Discussed importance of lipid control in setting of diabetes  -Discussed the importance of taking statin every night and lifestyle changes to slow the risk of a cardiovascular event such as CVA or MI.          3. Uses self-applied continuous glucose monitoring device    -Dexcom G7 readings reviewed with patient.  -Interpretation:   -Av glucose improved. FBG controlled with basal insulin. PPG excursions absent.   -Medication regimen adjusted according to current findings.   -See glucose readings below.  -Re-eval upon next visit.         FOLLOW UP  Follow up in about 6 weeks (around 4/30/2025) for Virtual Visit.

## 2025-03-19 NOTE — PATIENT INSTRUCTIONS
Continue monitoring your blood sugars using your Dexcom G7      Blood glucose goals:   Fasting blood glucose goal:  mg/dL.  Premeal blood glucose goal:  mg/dL.  Postmeal blood glucose goal ( 2 hrs): <180 mg/dL.  Bedtime / overnight blood glucose goal:  mg/dL.       Start taking Rosuvastatin 5 mg at bedtime.       Stop Rosuvastatin if you experience muscle cramps or weakness.      Refer to handout on low cholesterol diet.      Continue taking Jardiance 25 mg daily.      Continue to hold Metformin.      Decrease Basaglar to 5 units starting tonight and may hold insulin as long as your morning numbers remain 130 or less x 3 days in a row.        If your morning number is above 130, resume Basaglar 5 units that same night.      Refer to the food brochures when planning meals.      Increase your exercise regimen.        F/u on 4/30/2025 at 4pm for  virtual visit.

## 2025-03-21 ENCOUNTER — OFFICE VISIT (OUTPATIENT)
Dept: OPHTHALMOLOGY | Facility: CLINIC | Age: 60
End: 2025-03-21
Payer: COMMERCIAL

## 2025-03-21 ENCOUNTER — CLINICAL SUPPORT (OUTPATIENT)
Dept: OPHTHALMOLOGY | Facility: CLINIC | Age: 60
End: 2025-03-21
Payer: COMMERCIAL

## 2025-03-21 DIAGNOSIS — H25.13 AGE-RELATED NUCLEAR CATARACT OF BOTH EYES: ICD-10-CM

## 2025-03-21 DIAGNOSIS — H43.813 VITREOUS DEGENERATION OF BOTH EYES: ICD-10-CM

## 2025-03-21 DIAGNOSIS — E11.3593 PROLIFERATIVE DIABETIC RETINOPATHY OF BOTH EYES WITHOUT MACULAR EDEMA ASSOCIATED WITH TYPE 2 DIABETES MELLITUS: Primary | ICD-10-CM

## 2025-03-21 DIAGNOSIS — H43.11 VITREOUS HEMORRHAGE, RIGHT: ICD-10-CM

## 2025-03-21 PROCEDURE — 99999 PR PBB SHADOW E&M-EST. PATIENT-LVL I: CPT | Mod: PBBFAC,,,

## 2025-03-21 PROCEDURE — 99999 PR PBB SHADOW E&M-EST. PATIENT-LVL III: CPT | Mod: PBBFAC,,, | Performed by: OPHTHALMOLOGY

## 2025-03-21 NOTE — PROGRESS NOTES
HPI    1mo DFE OCT PRP OS  S/P PRP OD 12/20/24    Pt sts VA blurry. Pt sts ahe has a floater in OD   No new f/f   No pain or discomfort  Last edited by Vera Alberts on 3/21/2025  9:29 AM.     HPI    1mo DFE OCT PRP OS  S/P PRP OD 12/20/24    Pt sts VA blurry. Pt sts ahe has a floater in OD   No new f/f   No pain or discomfort  Last edited by Vera Alberts on 3/21/2025  9:29 AM.         A/P    ICD-10-CM ICD-9-CM   1. Proliferative diabetic retinopathy of both eyes without macular edema associated with type 2 diabetes mellitus  E11.3593 250.50     362.02   2. Vitreous hemorrhage, right  H43.11 379.23   3. Vitreous degeneration of both eyes  H43.813 379.21   4. Age-related nuclear cataract of both eyes  H25.13 366.16       1. Proliferative diabetic retinopathy of both eyes without macular edema associated with type 2 diabetes mellitus  2. Vitreous hemorrhage, right  PCP  Bebe Saldana MD - Recent notes reviewed   02/14/2025  6.8  A1C    FA with leakage OU, capillary dropout    OD: s/p PRP 12/20/24  VA 20/50 (was 20/40), doing well since PRP, VH resolved  Plan: Observation      OS: s/p PRP 2/11/25  VA 20/25 (was 20/40), regressing NV, good PRP with room   Plan: Observation for now, recheck 3 mo     Visit today is associated with current or anticipated ongoing medical care related to this patients single serious condition/complex condition (diabetic retinopathy)     Recommend good blood pressure control, tight blood glucose control, and good cholesterol control      3. Vitreous degeneration of both eyes  Has PVD OD, no RT/RD  Plan: Observation  Pathology of PVD, Retinal Tear, Retinal Detachment reviewed in great detail  RD precautions discussed in detail, patient expressed understanding  RTC immediately PRN (especially ANY change flashes, floaters, vision, visual field)     4. Age-related nuclear cataract of both eyes  Mild NS, NVS  Plan: Update Mrx prn    RTC 3 mo DFE/OCTm OU, poss Avastin OU      I saw and  examined the patient and reviewed in detail the findings documented. The final examination findings, image interpretations which have been independently interpreted, and plan as documented in the record represent my personal judgment and conclusions.    Art Rider MD  Vitreoretinal Surgery   [facility]    Detail Level: Zone

## 2025-04-29 DIAGNOSIS — E11.65 TYPE 2 DIABETES MELLITUS WITH HYPERGLYCEMIA, WITHOUT LONG-TERM CURRENT USE OF INSULIN: ICD-10-CM

## 2025-04-30 NOTE — TELEPHONE ENCOUNTER
Attempted to call patient for follow up per provider request. Patient didn't answer, unable to leave message.

## 2025-05-07 ENCOUNTER — PATIENT MESSAGE (OUTPATIENT)
Dept: OPHTHALMOLOGY | Facility: CLINIC | Age: 60
End: 2025-05-07
Payer: COMMERCIAL

## 2025-05-13 ENCOUNTER — TELEPHONE (OUTPATIENT)
Dept: OPHTHALMOLOGY | Facility: CLINIC | Age: 60
End: 2025-05-13
Payer: COMMERCIAL

## 2025-06-02 DIAGNOSIS — E11.65 TYPE 2 DIABETES MELLITUS WITH HYPERGLYCEMIA, WITHOUT LONG-TERM CURRENT USE OF INSULIN: ICD-10-CM

## 2025-06-24 ENCOUNTER — OFFICE VISIT (OUTPATIENT)
Dept: OPHTHALMOLOGY | Facility: CLINIC | Age: 60
End: 2025-06-24
Payer: COMMERCIAL

## 2025-06-24 ENCOUNTER — CLINICAL SUPPORT (OUTPATIENT)
Dept: OPHTHALMOLOGY | Facility: CLINIC | Age: 60
End: 2025-06-24
Payer: COMMERCIAL

## 2025-06-24 DIAGNOSIS — H25.13 AGE-RELATED NUCLEAR CATARACT OF BOTH EYES: ICD-10-CM

## 2025-06-24 DIAGNOSIS — H04.123 DRY EYE SYNDROME OF BOTH EYES: ICD-10-CM

## 2025-06-24 DIAGNOSIS — E11.3593 PROLIFERATIVE DIABETIC RETINOPATHY OF BOTH EYES WITHOUT MACULAR EDEMA ASSOCIATED WITH TYPE 2 DIABETES MELLITUS: Primary | ICD-10-CM

## 2025-06-24 DIAGNOSIS — H43.813 VITREOUS DEGENERATION OF BOTH EYES: ICD-10-CM

## 2025-06-24 DIAGNOSIS — H43.11 VITREOUS HEMORRHAGE, RIGHT: ICD-10-CM

## 2025-06-24 PROCEDURE — 1159F MED LIST DOCD IN RCRD: CPT | Mod: CPTII,S$GLB,, | Performed by: OPHTHALMOLOGY

## 2025-06-24 PROCEDURE — 1160F RVW MEDS BY RX/DR IN RCRD: CPT | Mod: CPTII,S$GLB,, | Performed by: OPHTHALMOLOGY

## 2025-06-24 PROCEDURE — 99999 PR PBB SHADOW E&M-EST. PATIENT-LVL III: CPT | Mod: PBBFAC,,, | Performed by: OPHTHALMOLOGY

## 2025-06-24 PROCEDURE — 92202 OPSCPY EXTND ON/MAC DRAW: CPT | Mod: S$GLB,,, | Performed by: OPHTHALMOLOGY

## 2025-06-24 PROCEDURE — 3066F NEPHROPATHY DOC TX: CPT | Mod: CPTII,S$GLB,, | Performed by: OPHTHALMOLOGY

## 2025-06-24 PROCEDURE — 92134 CPTRZ OPH DX IMG PST SGM RTA: CPT | Mod: S$GLB,,, | Performed by: OPHTHALMOLOGY

## 2025-06-24 PROCEDURE — 99213 OFFICE O/P EST LOW 20 MIN: CPT | Mod: S$GLB,,, | Performed by: OPHTHALMOLOGY

## 2025-06-24 PROCEDURE — 3061F NEG MICROALBUMINURIA REV: CPT | Mod: CPTII,S$GLB,, | Performed by: OPHTHALMOLOGY

## 2025-06-24 PROCEDURE — G2211 COMPLEX E/M VISIT ADD ON: HCPCS | Mod: S$GLB,,, | Performed by: OPHTHALMOLOGY

## 2025-06-24 PROCEDURE — 3044F HG A1C LEVEL LT 7.0%: CPT | Mod: CPTII,S$GLB,, | Performed by: OPHTHALMOLOGY

## 2025-06-24 NOTE — PROGRESS NOTES
HPI    Pt is here for a 3 month DFE/OCT for diabetic retinopathy OU    Pt states no eye pain or discomfort. No flashes, but states that the one   floater she has been seeing has been slowly starting to clear up. States   that she will have some FB sensation/pressure around OU, but it only   happens every once in awhile. Vision seems stable. No other complaints.  Last edited by David Santos MA on 6/24/2025  9:33 AM.     HPI    Pt is here for a 3 month DFE/OCT for diabetic retinopathy OU    Pt states no eye pain or discomfort. No flashes, but states that the one   floater she has been seeing has been slowly starting to clear up. States   that she will have some FB sensation/pressure around OU, but it only   happens every once in awhile. Vision seems stable. No other complaints.  Last edited by David Santos MA on 6/24/2025  9:33 AM.         A/P    ICD-10-CM ICD-9-CM   1. Proliferative diabetic retinopathy of both eyes without macular edema associated with type 2 diabetes mellitus  E11.3593 250.50     362.02   2. Vitreous hemorrhage, right  H43.11 379.23   3. Vitreous degeneration of both eyes  H43.813 379.21   4. Age-related nuclear cataract of both eyes  H25.13 366.16   5. Dry eye syndrome of both eyes  H04.123 375.15       1. Proliferative diabetic retinopathy of both eyes without macular edema associated with type 2 diabetes mellitus  2. Vitreous hemorrhage, right  PCP  Bebe Saldana MD - Recent notes reviewed   02/14/2025  6.8  A1C    FA with leakage OU, capillary dropout    OD: s/p PRP 12/20/24  VA 20/30 (was 20/50), doing well since PRP   Plan: Observation      OS: s/p PRP 2/11/25  VA 20/30 (was 20/25), regressed NV, good PRP with room   Plan: Observation for now, recheck 3-4 mo     Visit today is associated with current or anticipated ongoing medical care related to this patients single serious condition/complex condition (diabetic retinopathy)     Recommend good blood pressure control, tight blood  glucose control, and good cholesterol control      3. Vitreous degeneration of both eyes   no RT/RD  Plan: Observation  Pathology of PVD, Retinal Tear, Retinal Detachment reviewed in great detail  RD precautions discussed in detail, patient expressed understanding  RTC immediately PRN (especially ANY change flashes, floaters, vision, visual field)     4. Age-related nuclear cataract of both eyes  Mild NS, NVS  Plan: Update Mrx prn      5. Dry eye syndrome of both eyes  Mild dry eye  Rec ATs prn         RTC 3-4 mo DFE/OCTm OU, poss Avastin OU      I saw and examined the patient and reviewed in detail the findings documented. The final examination findings, image interpretations which have been independently interpreted, and plan as documented in the record represent my personal judgment and conclusions.    Art Rider MD  Vitreoretinal Surgery   [facility]

## 2025-07-01 ENCOUNTER — TELEPHONE (OUTPATIENT)
Dept: ENDOSCOPY | Facility: HOSPITAL | Age: 60
End: 2025-07-01
Payer: COMMERCIAL

## 2025-07-01 VITALS — WEIGHT: 168 LBS | BODY MASS INDEX: 29.77 KG/M2 | HEIGHT: 63 IN

## 2025-07-01 DIAGNOSIS — Z12.11 SPECIAL SCREENING FOR MALIGNANT NEOPLASMS, COLON: Primary | ICD-10-CM

## 2025-07-01 RX ORDER — SODIUM, POTASSIUM,MAG SULFATES 17.5-3.13G
1 SOLUTION, RECONSTITUTED, ORAL ORAL DAILY
Qty: 1 KIT | Refills: 0 | Status: SHIPPED | OUTPATIENT
Start: 2025-07-01 | End: 2025-07-03

## 2025-07-01 NOTE — TELEPHONE ENCOUNTER
Patient is scheduled for a Colonoscopy on 08/19/25 with Dr. MAYA Stapleton  Referral for procedure from  [facility] referral (see Appts tab)        If you are taking the oral medication(s):  Invokana (Canagliflozin), Farxiga (Dapagliflozin), Jardiance (Empagliflozin), Invokamet/Invokamet XR (invokana +metformin), Xigduo (farxiga + metformin), Synjardy XR (jardiance + metformin), hold 3 days prior to your procedure, please stop taking on 08/16/25.

## 2025-07-06 DIAGNOSIS — E11.65 TYPE 2 DIABETES MELLITUS WITH HYPERGLYCEMIA, WITHOUT LONG-TERM CURRENT USE OF INSULIN: ICD-10-CM

## 2025-07-07 NOTE — TELEPHONE ENCOUNTER
I'm not sure why this is stating I'm a non-participating provider to refill patient's Jardiance. Patient request Jardiance be filled at [facility].

## 2025-07-07 NOTE — TELEPHONE ENCOUNTER
Refill Routing Note   Medication(s) are not appropriate for processing by [facility] for the following reason(s):        Non-participating provider    [facility] action(s):  Route             Appointments  past 12m or future 3m with PCP    Date Provider   Last Visit   3/19/2025 Wellington Araujo FNP-C   Next Visit   Visit date not found Wellington Araujo FNP-C   ED visits in past 90 days: 0        Note composed:3:54 PM 07/07/2025

## 2025-08-15 ENCOUNTER — PATIENT MESSAGE (OUTPATIENT)
Dept: ENDOSCOPY | Facility: HOSPITAL | Age: 60
End: 2025-08-15
Payer: COMMERCIAL

## 2025-08-19 ENCOUNTER — ANESTHESIA (OUTPATIENT)
Dept: ENDOSCOPY | Facility: HOSPITAL | Age: 60
End: 2025-08-19
Payer: COMMERCIAL

## 2025-08-19 ENCOUNTER — HOSPITAL ENCOUNTER (OUTPATIENT)
Facility: HOSPITAL | Age: 60
Discharge: HOME | End: 2025-08-19
Attending: STUDENT IN AN ORGANIZED HEALTH CARE EDUCATION/TRAINING PROGRAM | Admitting: STUDENT IN AN ORGANIZED HEALTH CARE EDUCATION/TRAINING PROGRAM
Payer: COMMERCIAL

## 2025-08-19 ENCOUNTER — ANESTHESIA EVENT (OUTPATIENT)
Dept: ENDOSCOPY | Facility: HOSPITAL | Age: 60
End: 2025-08-19
Payer: COMMERCIAL

## 2025-08-19 VITALS
SYSTOLIC BLOOD PRESSURE: 130 MMHG | DIASTOLIC BLOOD PRESSURE: 80 MMHG | OXYGEN SATURATION: 99 % | HEIGHT: 63 IN | BODY MASS INDEX: 28.35 KG/M2 | TEMPERATURE: 98 F | WEIGHT: 160 LBS | RESPIRATION RATE: 16 BRPM | HEART RATE: 80 BPM

## 2025-08-19 DIAGNOSIS — Z12.11 COLON CANCER SCREENING: Primary | ICD-10-CM

## 2025-08-19 DIAGNOSIS — Z12.11 SCREEN FOR COLON CANCER: ICD-10-CM

## 2025-08-19 LAB — POCT GLUCOSE: 132 MG/DL (ref 70–110)

## 2025-08-19 PROCEDURE — 37000009 HC ANESTHESIA EA ADD 15 MINS: Performed by: STUDENT IN AN ORGANIZED HEALTH CARE EDUCATION/TRAINING PROGRAM

## 2025-08-19 PROCEDURE — G0121 COLON CA SCRN NOT HI RSK IND: HCPCS | Mod: ,,, | Performed by: STUDENT IN AN ORGANIZED HEALTH CARE EDUCATION/TRAINING PROGRAM

## 2025-08-19 PROCEDURE — 25000003 PHARM REV CODE 250: Performed by: STUDENT IN AN ORGANIZED HEALTH CARE EDUCATION/TRAINING PROGRAM

## 2025-08-19 PROCEDURE — 63600175 PHARM REV CODE 636 W HCPCS: Performed by: NURSE ANESTHETIST, CERTIFIED REGISTERED

## 2025-08-19 PROCEDURE — 37000008 HC ANESTHESIA 1ST 15 MINUTES: Performed by: STUDENT IN AN ORGANIZED HEALTH CARE EDUCATION/TRAINING PROGRAM

## 2025-08-19 PROCEDURE — G0121 COLON CA SCRN NOT HI RSK IND: HCPCS | Performed by: STUDENT IN AN ORGANIZED HEALTH CARE EDUCATION/TRAINING PROGRAM

## 2025-08-19 PROCEDURE — 82962 GLUCOSE BLOOD TEST: CPT | Performed by: STUDENT IN AN ORGANIZED HEALTH CARE EDUCATION/TRAINING PROGRAM

## 2025-08-19 RX ORDER — GLUCAGON 1 MG
1 KIT INJECTION
OUTPATIENT
Start: 2025-08-19

## 2025-08-19 RX ORDER — LIDOCAINE HYDROCHLORIDE 20 MG/ML
INJECTION INTRAVENOUS
Status: DISCONTINUED | OUTPATIENT
Start: 2025-08-19 | End: 2025-08-19

## 2025-08-19 RX ORDER — SODIUM CHLORIDE 0.9 % (FLUSH) 0.9 %
10 SYRINGE (ML) INJECTION
OUTPATIENT
Start: 2025-08-19

## 2025-08-19 RX ORDER — ONDANSETRON HYDROCHLORIDE 2 MG/ML
4 INJECTION, SOLUTION INTRAVENOUS DAILY PRN
OUTPATIENT
Start: 2025-08-19

## 2025-08-19 RX ORDER — SODIUM CHLORIDE 9 MG/ML
INJECTION, SOLUTION INTRAVENOUS CONTINUOUS
Status: DISCONTINUED | OUTPATIENT
Start: 2025-08-19 | End: 2025-08-19 | Stop reason: HOSPADM

## 2025-08-19 RX ORDER — PROPOFOL 10 MG/ML
VIAL (ML) INTRAVENOUS
Status: DISCONTINUED | OUTPATIENT
Start: 2025-08-19 | End: 2025-08-19

## 2025-08-19 RX ADMIN — SODIUM CHLORIDE: 0.9 INJECTION, SOLUTION INTRAVENOUS at 09:08

## 2025-08-19 RX ADMIN — PROPOFOL 20 MG: 10 INJECTION, EMULSION INTRAVENOUS at 09:08

## 2025-08-19 RX ADMIN — PROPOFOL 150 MCG/KG/MIN: 10 INJECTION, EMULSION INTRAVENOUS at 09:08

## 2025-08-19 RX ADMIN — LIDOCAINE HYDROCHLORIDE 50 MG: 20 INJECTION INTRAVENOUS at 09:08

## 2025-08-19 RX ADMIN — PROPOFOL 60 MG: 10 INJECTION, EMULSION INTRAVENOUS at 09:08
